# Patient Record
Sex: MALE | Race: WHITE | Employment: OTHER | URBAN - METROPOLITAN AREA
[De-identification: names, ages, dates, MRNs, and addresses within clinical notes are randomized per-mention and may not be internally consistent; named-entity substitution may affect disease eponyms.]

---

## 2021-01-23 ENCOUNTER — APPOINTMENT (OUTPATIENT)
Dept: GENERAL RADIOLOGY | Age: 76
End: 2021-01-23
Attending: EMERGENCY MEDICINE
Payer: MEDICARE

## 2021-01-23 ENCOUNTER — HOSPITAL ENCOUNTER (EMERGENCY)
Age: 76
Discharge: HOME | End: 2021-01-23
Attending: EMERGENCY MEDICINE | Admitting: EMERGENCY MEDICINE
Payer: MEDICARE

## 2021-01-23 VITALS
SYSTOLIC BLOOD PRESSURE: 131 MMHG | WEIGHT: 140 LBS | DIASTOLIC BLOOD PRESSURE: 57 MMHG | HEART RATE: 70 BPM | BODY MASS INDEX: 20.73 KG/M2 | TEMPERATURE: 98.2 F | RESPIRATION RATE: 16 BRPM | HEIGHT: 69 IN | OXYGEN SATURATION: 97 %

## 2021-01-23 DIAGNOSIS — U07.1 COVID-19 VIRUS INFECTION: Primary | ICD-10-CM

## 2021-01-23 PROCEDURE — 74011250636 HC RX REV CODE- 250/636: Performed by: EMERGENCY MEDICINE

## 2021-01-23 PROCEDURE — 71046 X-RAY EXAM CHEST 2 VIEWS: CPT

## 2021-01-23 PROCEDURE — 99282 EMERGENCY DEPT VISIT SF MDM: CPT

## 2021-01-23 PROCEDURE — 96374 THER/PROPH/DIAG INJ IV PUSH: CPT

## 2021-01-23 RX ORDER — DEXAMETHASONE SODIUM PHOSPHATE 100 MG/10ML
10 INJECTION INTRAMUSCULAR; INTRAVENOUS
Status: COMPLETED | OUTPATIENT
Start: 2021-01-23 | End: 2021-01-23

## 2021-01-23 RX ADMIN — DEXAMETHASONE SODIUM PHOSPHATE 10 MG: 10 INJECTION INTRAMUSCULAR; INTRAVENOUS at 14:54

## 2021-01-23 NOTE — ED TRIAGE NOTES
Pt is hearing impaired so his  gives most information. States pt has had a productive cough with sore throat for about two days and tested positive for covid yesterday. O2 sat is 97% in triage. Pt has a mask for triage.

## 2021-01-23 NOTE — ED PROVIDER NOTES
[address] Joan Charles is a 76 y.o. male seen on 1/23/2021 in the [address] [place] [address] in room IWR/IWR. Chief Complaint   Patient presents with    Positive For Covid-19     HPI:      Historian: Patient    REVIEW OF SYSTEMS     Review of Systems   Constitutional: Negative. HENT: Negative. Respiratory: Positive for cough. Cardiovascular: Negative. Gastrointestinal: Negative. Genitourinary: Negative. Musculoskeletal: Negative. Skin: Negative. Neurological: Positive for headaches. Hematological: Negative. Psychiatric/Behavioral: Negative. All other systems reviewed and are negative. PAST MEDICAL HISTORY     No past medical history on file. No past surgical history on file. Social History     Socioeconomic History    Marital status: SINGLE     Spouse name: Not on file    Number of children: Not on file    Years of education: Not on file    Highest education level: Not on file     None     Allergies   Allergen Reactions    Codeine Itching        PHYSICAL EXAM       Vitals:    01/23/21 1347   BP: (!) 131/57   Pulse: 70   Resp: 16   Temp: 98.2 °F (36.8 °C)   SpO2: 97%    Vital signs were reviewed. Physical Exam  Vitals signs and nursing note reviewed. Constitutional:       Appearance: Normal appearance. HENT:      Head: Atraumatic. Nose: Nose normal.      Mouth/Throat:      Mouth: Mucous membranes are moist.   Eyes:      Extraocular Movements: Extraocular movements intact. Neck:      Musculoskeletal: Normal range of motion. Cardiovascular:      Rate and Rhythm: Normal rate and regular rhythm. Pulses: Normal pulses. Heart sounds: Normal heart sounds. Pulmonary:      Effort: Pulmonary effort is normal.      Breath sounds: Rhonchi present. Abdominal:      Palpations: Abdomen is soft. Tenderness: There is no abdominal tenderness. Musculoskeletal: Normal range of motion.    Skin:     General: Skin is warm and dry. Neurological:      General: No focal deficit present. Mental Status: He is alert and oriented to person, place, and time. Psychiatric:         Mood and Affect: Mood normal.         Behavior: Behavior normal.         Thought Content: Thought content normal.         Judgment: Judgment normal.          MEDICAL DECISION MAKING     ED Course:    No results found for this or any previous visit (from the past 8 hour(s)). Xr Chest Pa Lat    Result Date: 1/23/2021  History: cough with positive covid Two views chest Findings: The lungs are well expanded and clear. The cardiac silhouette, and mediastinal contour, and osseous structures are normal.     Impression: Unremarkable two-view chest.       MDM  Number of Diagnoses or Management Options  COVID-19 virus infection  Diagnosis management comments: 51-year-old male with known Covid infection. Patient is not hypoxic. Patient will be discharged home and will  been given a home pulse oximeter. Patient will return to the emergency department her oxygen levels less than 90%. Amount and/or Complexity of Data Reviewed  Tests in the radiology section of CPT®: ordered and reviewed    Patient Progress  Patient progress: stable        Disposition: Discharged  Diagnosis:     ICD-10-CM ICD-9-CM   1. COVID-19 virus infection  U07.1 079.89     ____________________________________________________________________  A portion of this note was generated using voice recognition dictation software. While the note has been reviewed for accuracy, please note certain words and phrases may not be transcribed as intended and some grammatical and/or typographical errors may be present.

## 2021-01-23 NOTE — ED NOTES
I have reviewed discharge instructions with the patient. The patient verbalized understanding. Patient left ED via Discharge Method: wheelchair to Home with . Opportunity for questions and clarification provided. Patient given 0 scripts. To continue your aftercare when you leave the hospital, you may receive an automated call from our care team to check in on how you are doing. This is a free service and part of our promise to provide the best care and service to meet your aftercare needs.  If you have questions, or wish to unsubscribe from this service please call [phone]. Thank you for Choosing our [facility] Emergency Department.

## 2021-02-01 ENCOUNTER — PATIENT OUTREACH (OUTPATIENT)
Dept: CASE MANAGEMENT | Age: 76
End: 2021-02-01

## 2021-02-01 NOTE — PROGRESS NOTES
Yellow alert noted in remote COVID-19 Loop Symptom Monitoring Program. Messaged patient to notify Jeri Mathias if symptoms have worsened since yesterday.

## 2021-03-22 ENCOUNTER — APPOINTMENT (OUTPATIENT)
Dept: CT IMAGING | Age: 76
DRG: 305 | End: 2021-03-22
Attending: EMERGENCY MEDICINE
Payer: MEDICARE

## 2021-03-22 ENCOUNTER — HOSPITAL ENCOUNTER (INPATIENT)
Age: 76
LOS: 1 days | Discharge: HOME | DRG: 305 | End: 2021-03-25
Attending: EMERGENCY MEDICINE | Admitting: INTERNAL MEDICINE
Payer: MEDICARE

## 2021-03-22 DIAGNOSIS — I10 MALIGNANT HYPERTENSION: Primary | ICD-10-CM

## 2021-03-22 DIAGNOSIS — N17.9 AKI (ACUTE KIDNEY INJURY) (HCC): ICD-10-CM

## 2021-03-22 DIAGNOSIS — R27.0 ATAXIA: ICD-10-CM

## 2021-03-22 PROBLEM — E11.9 TYPE 2 DIABETES MELLITUS WITHOUT COMPLICATION, WITHOUT LONG-TERM CURRENT USE OF INSULIN (HCC): Chronic | Status: ACTIVE | Noted: 2020-11-03

## 2021-03-22 PROBLEM — H91.93 BILATERAL HEARING LOSS: Chronic | Status: ACTIVE | Noted: 2018-01-09

## 2021-03-22 PROBLEM — I16.0 HYPERTENSIVE URGENCY: Status: ACTIVE | Noted: 2021-03-22

## 2021-03-22 PROBLEM — E11.9 TYPE 2 DIABETES MELLITUS WITHOUT COMPLICATION, WITHOUT LONG-TERM CURRENT USE OF INSULIN (HCC): Status: ACTIVE | Noted: 2020-11-03

## 2021-03-22 PROBLEM — H91.93 BILATERAL HEARING LOSS: Status: ACTIVE | Noted: 2018-01-09

## 2021-03-22 PROBLEM — N18.30 ACUTE RENAL FAILURE SUPERIMPOSED ON STAGE 3 CHRONIC KIDNEY DISEASE (HCC): Status: ACTIVE | Noted: 2021-03-22

## 2021-03-22 PROBLEM — N18.9 ACUTE ON CHRONIC RENAL FAILURE (HCC): Status: ACTIVE | Noted: 2021-03-22

## 2021-03-22 LAB
ALBUMIN SERPL-MCNC: 4.3 G/DL (ref 3.2–4.6)
ALBUMIN/GLOB SERPL: 1.3 {RATIO} (ref 1.2–3.5)
ALP SERPL-CCNC: 57 U/L (ref 50–136)
ALT SERPL-CCNC: 38 U/L (ref 12–65)
ANION GAP SERPL CALC-SCNC: 5 MMOL/L (ref 7–16)
AST SERPL-CCNC: 24 U/L (ref 15–37)
ATRIAL RATE: 70 BPM
BASOPHILS # BLD: 0.1 K/UL (ref 0–0.2)
BASOPHILS NFR BLD: 2 % (ref 0–2)
BILIRUB SERPL-MCNC: 0.4 MG/DL (ref 0.2–1.1)
BUN SERPL-MCNC: 36 MG/DL (ref 8–23)
CALCIUM SERPL-MCNC: 10 MG/DL (ref 8.3–10.4)
CALCULATED P AXIS, ECG09: 60 DEGREES
CALCULATED R AXIS, ECG10: 21 DEGREES
CALCULATED T AXIS, ECG11: 56 DEGREES
CHLORIDE SERPL-SCNC: 105 MMOL/L (ref 98–107)
CO2 SERPL-SCNC: 28 MMOL/L (ref 21–32)
CREAT SERPL-MCNC: 1.78 MG/DL (ref 0.8–1.5)
DIAGNOSIS, 93000: NORMAL
DIFFERENTIAL METHOD BLD: ABNORMAL
EOSINOPHIL # BLD: 0.1 K/UL (ref 0–0.8)
EOSINOPHIL NFR BLD: 2 % (ref 0.5–7.8)
ERYTHROCYTE [DISTWIDTH] IN BLOOD BY AUTOMATED COUNT: 12.9 % (ref 11.9–14.6)
FOLATE SERPL-MCNC: 22.7 NG/ML (ref 3.1–17.5)
GLOBULIN SER CALC-MCNC: 3.4 G/DL (ref 2.3–3.5)
GLUCOSE BLD STRIP.AUTO-MCNC: 89 MG/DL (ref 65–100)
GLUCOSE SERPL-MCNC: 116 MG/DL (ref 65–100)
HCT VFR BLD AUTO: 43.2 % (ref 41.1–50.3)
HGB BLD-MCNC: 14 G/DL (ref 13.6–17.2)
IMM GRANULOCYTES # BLD AUTO: 0 K/UL (ref 0–0.5)
IMM GRANULOCYTES NFR BLD AUTO: 0 % (ref 0–5)
LYMPHOCYTES # BLD: 2.3 K/UL (ref 0.5–4.6)
LYMPHOCYTES NFR BLD: 38 % (ref 13–44)
MAGNESIUM SERPL-MCNC: 1.8 MG/DL (ref 1.8–2.4)
MCH RBC QN AUTO: 29.2 PG (ref 26.1–32.9)
MCHC RBC AUTO-ENTMCNC: 32.4 G/DL (ref 31.4–35)
MCV RBC AUTO: 90 FL (ref 79.6–97.8)
MONOCYTES # BLD: 0.8 K/UL (ref 0.1–1.3)
MONOCYTES NFR BLD: 13 % (ref 4–12)
NEUTS SEG # BLD: 2.8 K/UL (ref 1.7–8.2)
NEUTS SEG NFR BLD: 46 % (ref 43–78)
NRBC # BLD: 0 K/UL (ref 0–0.2)
P-R INTERVAL, ECG05: 156 MS
PLATELET # BLD AUTO: 257 K/UL (ref 150–450)
PMV BLD AUTO: 11.2 FL (ref 9.4–12.3)
POTASSIUM SERPL-SCNC: 5.1 MMOL/L (ref 3.5–5.1)
PROT SERPL-MCNC: 7.7 G/DL (ref 6.3–8.2)
Q-T INTERVAL, ECG07: 362 MS
QRS DURATION, ECG06: 82 MS
QTC CALCULATION (BEZET), ECG08: 390 MS
RBC # BLD AUTO: 4.8 M/UL (ref 4.23–5.6)
SERVICE CMNT-IMP: NORMAL
SODIUM SERPL-SCNC: 138 MMOL/L (ref 136–145)
VENTRICULAR RATE, ECG03: 70 BPM
VIT B12 SERPL-MCNC: 452 PG/ML (ref 193–986)
WBC # BLD AUTO: 6.1 K/UL (ref 4.3–11.1)

## 2021-03-22 PROCEDURE — 82962 GLUCOSE BLOOD TEST: CPT

## 2021-03-22 PROCEDURE — 96374 THER/PROPH/DIAG INJ IV PUSH: CPT

## 2021-03-22 PROCEDURE — 85025 COMPLETE CBC W/AUTO DIFF WBC: CPT

## 2021-03-22 PROCEDURE — 74011250636 HC RX REV CODE- 250/636: Performed by: EMERGENCY MEDICINE

## 2021-03-22 PROCEDURE — 83735 ASSAY OF MAGNESIUM: CPT

## 2021-03-22 PROCEDURE — 74011000636 HC RX REV CODE- 636: Performed by: EMERGENCY MEDICINE

## 2021-03-22 PROCEDURE — 2709999900 HC NON-CHARGEABLE SUPPLY

## 2021-03-22 PROCEDURE — 80053 COMPREHEN METABOLIC PANEL: CPT

## 2021-03-22 PROCEDURE — 74011250636 HC RX REV CODE- 250/636: Performed by: FAMILY MEDICINE

## 2021-03-22 PROCEDURE — 82746 ASSAY OF FOLIC ACID SERUM: CPT

## 2021-03-22 PROCEDURE — 70450 CT HEAD/BRAIN W/O DYE: CPT

## 2021-03-22 PROCEDURE — 99285 EMERGENCY DEPT VISIT HI MDM: CPT

## 2021-03-22 PROCEDURE — 99218 HC RM OBSERVATION: CPT

## 2021-03-22 PROCEDURE — 74011000250 HC RX REV CODE- 250: Performed by: EMERGENCY MEDICINE

## 2021-03-22 PROCEDURE — 74011000258 HC RX REV CODE- 258: Performed by: EMERGENCY MEDICINE

## 2021-03-22 PROCEDURE — 96361 HYDRATE IV INFUSION ADD-ON: CPT

## 2021-03-22 PROCEDURE — 70498 CT ANGIOGRAPHY NECK: CPT

## 2021-03-22 PROCEDURE — 93005 ELECTROCARDIOGRAM TRACING: CPT | Performed by: EMERGENCY MEDICINE

## 2021-03-22 PROCEDURE — 74011250637 HC RX REV CODE- 250/637: Performed by: FAMILY MEDICINE

## 2021-03-22 PROCEDURE — 82607 VITAMIN B-12: CPT

## 2021-03-22 PROCEDURE — 0042T CT PERF W CBF: CPT

## 2021-03-22 PROCEDURE — 4A03X5D MEASUREMENT OF ARTERIAL FLOW, INTRACRANIAL, EXTERNAL APPROACH: ICD-10-PCS | Performed by: STUDENT IN AN ORGANIZED HEALTH CARE EDUCATION/TRAINING PROGRAM

## 2021-03-22 RX ORDER — FENOFIBRATE 50 MG/1
50 CAPSULE ORAL DAILY
COMMUNITY

## 2021-03-22 RX ORDER — DEXTROSE 50 % IN WATER (D50W) INTRAVENOUS SYRINGE
25-50 AS NEEDED
Status: DISCONTINUED | OUTPATIENT
Start: 2021-03-22 | End: 2021-03-25 | Stop reason: HOSPADM

## 2021-03-22 RX ORDER — HYDRALAZINE HYDROCHLORIDE 25 MG/1
50 TABLET, FILM COATED ORAL
Status: DISCONTINUED | OUTPATIENT
Start: 2021-03-22 | End: 2021-03-25 | Stop reason: HOSPADM

## 2021-03-22 RX ORDER — HYDROCHLOROTHIAZIDE 25 MG/1
25 TABLET ORAL DAILY
Status: DISCONTINUED | OUTPATIENT
Start: 2021-03-23 | End: 2021-03-25 | Stop reason: HOSPADM

## 2021-03-22 RX ORDER — ASPIRIN 325 MG
325 TABLET ORAL DAILY
Status: DISCONTINUED | OUTPATIENT
Start: 2021-03-23 | End: 2021-03-25 | Stop reason: HOSPADM

## 2021-03-22 RX ORDER — DEXTROSE 40 %
15 GEL (GRAM) ORAL AS NEEDED
Status: DISCONTINUED | OUTPATIENT
Start: 2021-03-22 | End: 2021-03-25 | Stop reason: HOSPADM

## 2021-03-22 RX ORDER — LANOLIN ALCOHOL/MO/W.PET/CERES
400 CREAM (GRAM) TOPICAL 2 TIMES DAILY
Status: DISCONTINUED | OUTPATIENT
Start: 2021-03-22 | End: 2021-03-24

## 2021-03-22 RX ORDER — SODIUM CHLORIDE 0.9 % (FLUSH) 0.9 %
10 SYRINGE (ML) INJECTION
Status: COMPLETED | OUTPATIENT
Start: 2021-03-22 | End: 2021-03-22

## 2021-03-22 RX ORDER — FAMOTIDINE 20 MG/1
20 TABLET, FILM COATED ORAL
Status: DISCONTINUED | OUTPATIENT
Start: 2021-03-22 | End: 2021-03-25 | Stop reason: HOSPADM

## 2021-03-22 RX ORDER — ZINC GLUCONATE 10 MG
400 LOZENGE ORAL 3 TIMES DAILY
COMMUNITY

## 2021-03-22 RX ORDER — SODIUM CHLORIDE 0.9 % (FLUSH) 0.9 %
5-40 SYRINGE (ML) INJECTION EVERY 8 HOURS
Status: DISCONTINUED | OUTPATIENT
Start: 2021-03-22 | End: 2021-03-25 | Stop reason: HOSPADM

## 2021-03-22 RX ORDER — LOSARTAN POTASSIUM 50 MG/1
25 TABLET ORAL DAILY
Status: DISCONTINUED | OUTPATIENT
Start: 2021-03-23 | End: 2021-03-25 | Stop reason: HOSPADM

## 2021-03-22 RX ORDER — LOSARTAN POTASSIUM 25 MG/1
25 TABLET ORAL DAILY
COMMUNITY

## 2021-03-22 RX ORDER — SODIUM CHLORIDE 9 MG/ML
100 INJECTION, SOLUTION INTRAVENOUS CONTINUOUS
Status: DISPENSED | OUTPATIENT
Start: 2021-03-22 | End: 2021-03-23

## 2021-03-22 RX ORDER — METOCLOPRAMIDE HYDROCHLORIDE 5 MG/ML
10 INJECTION INTRAMUSCULAR; INTRAVENOUS
Status: DISCONTINUED | OUTPATIENT
Start: 2021-03-22 | End: 2021-03-22

## 2021-03-22 RX ORDER — LABETALOL HYDROCHLORIDE 5 MG/ML
20 INJECTION, SOLUTION INTRAVENOUS ONCE
Status: COMPLETED | OUTPATIENT
Start: 2021-03-22 | End: 2021-03-22

## 2021-03-22 RX ORDER — ONDANSETRON 2 MG/ML
4 INJECTION INTRAMUSCULAR; INTRAVENOUS
Status: DISCONTINUED | OUTPATIENT
Start: 2021-03-22 | End: 2021-03-25 | Stop reason: HOSPADM

## 2021-03-22 RX ORDER — SODIUM CHLORIDE 0.9 % (FLUSH) 0.9 %
5-40 SYRINGE (ML) INJECTION AS NEEDED
Status: DISCONTINUED | OUTPATIENT
Start: 2021-03-22 | End: 2021-03-25 | Stop reason: HOSPADM

## 2021-03-22 RX ORDER — BISACODYL 5 MG
5 TABLET, DELAYED RELEASE (ENTERIC COATED) ORAL DAILY PRN
Status: DISCONTINUED | OUTPATIENT
Start: 2021-03-22 | End: 2021-03-25 | Stop reason: HOSPADM

## 2021-03-22 RX ORDER — ACETAMINOPHEN 325 MG/1
650 TABLET ORAL
Status: DISCONTINUED | OUTPATIENT
Start: 2021-03-22 | End: 2021-03-25 | Stop reason: HOSPADM

## 2021-03-22 RX ORDER — ENOXAPARIN SODIUM 100 MG/ML
40 INJECTION SUBCUTANEOUS EVERY 24 HOURS
Status: DISCONTINUED | OUTPATIENT
Start: 2021-03-22 | End: 2021-03-25 | Stop reason: HOSPADM

## 2021-03-22 RX ORDER — FENOFIBRATE 160 MG/1
160 TABLET ORAL DAILY
Status: DISCONTINUED | OUTPATIENT
Start: 2021-03-23 | End: 2021-03-25 | Stop reason: HOSPADM

## 2021-03-22 RX ORDER — METOCLOPRAMIDE HYDROCHLORIDE 5 MG/ML
5 INJECTION INTRAMUSCULAR; INTRAVENOUS
Status: DISPENSED | OUTPATIENT
Start: 2021-03-22 | End: 2021-03-23

## 2021-03-22 RX ADMIN — MAGNESIUM GLUCONATE 500 MG ORAL TABLET 400 MG: 500 TABLET ORAL at 21:13

## 2021-03-22 RX ADMIN — IOPAMIDOL 100 ML: 755 INJECTION, SOLUTION INTRAVENOUS at 17:19

## 2021-03-22 RX ADMIN — Medication 10 ML: at 21:13

## 2021-03-22 RX ADMIN — FAMOTIDINE 20 MG: 20 TABLET, FILM COATED ORAL at 21:13

## 2021-03-22 RX ADMIN — LABETALOL HYDROCHLORIDE 20 MG: 5 INJECTION INTRAVENOUS at 18:36

## 2021-03-22 RX ADMIN — SODIUM CHLORIDE 100 ML/HR: 900 INJECTION, SOLUTION INTRAVENOUS at 21:12

## 2021-03-22 RX ADMIN — SODIUM CHLORIDE 1000 ML: 900 INJECTION, SOLUTION INTRAVENOUS at 18:34

## 2021-03-22 RX ADMIN — SODIUM CHLORIDE 100 ML: 900 INJECTION, SOLUTION INTRAVENOUS at 17:19

## 2021-03-22 RX ADMIN — Medication 10 ML: at 17:19

## 2021-03-22 NOTE — ED PROVIDER NOTES
Patient is a pleasant 49-year-old male presenting to the emergency department complaining of feeling off balance and falling to the right which started yesterday and has persisted throughout the day today. He was accompanied to the ER by his partner who says that he has been asking him stroke questions over the last 24 hours and has noticed that he has had rambling conversations and a little bit of slurred speech. This happened last week and he went to [facility] on March 17 and had a CT of his head done at that time. The patient is hard of hearing even with his hearing aids and has to read lips but answers questions appropriately. He says he feels well now but feels different than normal.           Past Medical History:   Diagnosis Date    CAD (coronary artery disease)     leaky valves    Cancer ([facility]     prostate 2018    Hypertension     Ill-defined condition     potential hemorhoids        History reviewed. No pertinent surgical history. History reviewed. No pertinent family history.     Social History     Socioeconomic History    Marital status: SINGLE     Spouse name: Not on file    Number of children: Not on file    Years of education: Not on file    Highest education level: Not on file   Occupational History    Not on file   Social Needs    Financial resource strain: Not on file    Food insecurity     Worry: Not on file     Inability: Not on file    Transportation needs     Medical: Not on file     Non-medical: Not on file   Tobacco Use    Smoking status: Former Smoker    Smokeless tobacco: Never Used   Substance and Sexual Activity    Alcohol use: Not Currently    Drug use: Not Currently    Sexual activity: Not on file   Lifestyle    Physical activity     Days per week: Not on file     Minutes per session: Not on file    Stress: Not on file   Relationships    Social connections     Talks on phone: Not on file     Gets together: Not on file     Attends Quaker service: Not on file     Active member of club or organization: Not on file     Attends meetings of clubs or organizations: Not on file     Relationship status: Not on file    Intimate partner violence     Fear of current or ex partner: Not on file     Emotionally abused: Not on file     Physically abused: Not on file     Forced sexual activity: Not on file   Other Topics Concern    Not on file   Social History Narrative    Not on file         ALLERGIES: Codeine    Review of Systems   Musculoskeletal: Positive for gait problem. Neurological: Positive for numbness. All other systems reviewed and are negative. Vitals:    03/22/21 1535   BP: (!) 192/74   Pulse: 72   Resp: 16   Temp: 97.5 °F (36.4 °C)   SpO2: 97%   Weight: 67.1 kg (148 lb)   Height: 5' 10\" (1.778 m)            Physical Exam     GENERAL:The patient has Body mass index is 21.24 kg/m². Well-hydrated. Hard of hearing   VITAL SIGNS: Heart rate, blood pressure, respiratory rate reviewed as recorded in  nurse's notes  EYES: Pupils reactive. Extraocular motion intact. No conjunctival redness or drainage. EARS: No external masses or lesions. MOUTH/THROAT: Pharynx clear; airway patent. NECK: Supple, no meningeal signs. Trachea midline. No masses or thyromegaly. LUNGS: Breath sounds clear and equal bilaterally no accessory muscle use  CHEST: No deformity  CARDIOVASCULAR: Regular rate and rhythm  ABDOMEN: Soft without tenderness. No palpable masses or organomegaly. No  peritoneal signs. No rigidity. EXTREMITIES: No clubbing or cyanosis. No joint swelling. Normal muscle tone. No  restricted range of motion appreciated. NEUROLOGIC: Sensation is grossly intact. Cranial nerve exam reveals face is  symmetrical, tongue. Pronator drift positive in both legs equally. Negative pronator drift in the arms. Patient is able to finger-to-nose without difficulty and is able to do heel-to-shin without any trouble.   His  strength 5 out of 5 equal bilaterally and his sensation V1 through 3 is normal bilaterally. SKIN: No rash or petechiae. Good skin turgor palpated. PSYCHIATRIC: Alert and oriented. Appropriate behavior and judgment. MDM  Number of Diagnoses or Management Options  Diagnosis management comments: TIA, CVA, intracranial hemorrhage, ischemic stroke, brain tumor, Bell's palsy,    tension headache, migraine headache,    peripheral neuropathy, metabolic disorder, Guillian Phoenix syndrome, multiple sclerosis,    electrolyte abnormality    seizure, pseudoseizures, status epilepticus, malingering           Amount and/or Complexity of Data Reviewed  Clinical lab tests: reviewed and ordered  Tests in the radiology section of CPT®: reviewed and ordered  Tests in the medicine section of CPT®: ordered and reviewed  Review and summarize past medical records: yes  Independent visualization of images, tracings, or specimens: yes      ED Course as of Mar 22 1831   Mon Mar 22, 2021   1804 ED Interpretation    1. Scattered atherosclerotic changes not unusual for age. 2. Small but patent left vertebral artery. 3. No evidence of large vessel acute pathology or occlusion. CTA CODE NEURO HEAD AND NECK W CONT [KH]   1804 CT Perfusion w/ Cerebral Blood Flow [KH]   1804 ED Interpretation    No core infarct or ischemic penumbra. CT Perfusion w/ Cerebral Blood Flow [KH]   1806 Creatinine(!): 1.78 [KH]   1806 BUN(!): 36 [KH]   1829 I spoke to the patient and his partner about the findings in the emergency department. His kidney function is getting worse since he was seen at [facility] last week. He continues take his losartan at home and eat and drink normally. Because of his repeat ataxia with hypertension and acute kidney injury I talked to him about admission to the hospital and they are agreeable with this plan. The patient was given labetalol and fluids here in the emergency department.     [KH]      ED Course User Index  [KH] Garrett Kaufman,        Critical Care  Performed by: Demetri Florez DO  Authorized by: Demetri Florez DO     Comments:      Critical care time: 49 minutes of critical care time was performed in the emergency department. This was separate from any other procedures listed during the patients emergency department course. The failure to initiate these interventions on an urgent basis would likely have resulted in sudden, clinically significant or life-threatening deterioration in the patients condition.

## 2021-03-22 NOTE — ED TRIAGE NOTES
Patient arrives with complaint of palpitations, headache, balance problems, and tingling in his right index finger and thumb. The headache started this morning that was present when he woke up. He reports headache that has been off and on since January . The tingling in his fingers started around 0900 this morning, roughly two hours after he woke up. The balance issues started today as well. Patient reports that yesterday he complained to the male at bedside, that he wasn't feeling well and had difficulty talking.      This happened last week as well and was seen at [facility].

## 2021-03-22 NOTE — ROUTINE PROCESS
TRANSFER - OUT REPORT: 
 
Verbal report given to Teresita Flynn on Britton Heap  being transferred to Med / Surg rm # [phone] for routine progression of care Report consisted of patients Situation, Background, Assessment and  
Recommendations(SBAR). Information from the following report(s) SBAR was reviewed with the receiving nurse. Lines:  
Peripheral IV 03/22/21 (Active) Opportunity for questions and clarification was provided. Patient transported with: 
 Registered Nurse

## 2021-03-22 NOTE — H&P
[facility]  History and Physical    Patient ID:  Ofelia Dooley  male  [id number]  [id number]    Admission Date: 3/22/2021  Chief Complaint: Feeling off balance  Reason for Admission: Hypertensive urgency    ASSESSMENT & PLAN:    Active Hospital Problems    Diagnosis Date Noted    Acute renal failure superimposed on stage [facility] 03/22/2021    Hypertensive urgency 03/22/2021    Type 2 diabetes mellitus without complication, without long-term current use of insulin ([facility] [address] 11/03/2020     Principal Problem:    Hypertensive urgency (3/22/2021)  Continue home medications, add HCTZ daily and add hydralazine as needed    Ataxia: We will get a CVA work-up with MRI, echocardiogram, full labs, PT/OT evaluations. He is well beyond any timeline for TPA or intervention and his CTs in the ER were negative    Active Problems:    Acute renal failure superimposed on stage 3 chronic kidney disease ([facility] [address] (3/22/2021)  IV fluids and monitor labs; will continue his losartan, but if his renal failure worsens, will consider discontinuing or asking nephrology for their input      Type 2 diabetes mellitus without complication, without long-term current use of insulin ([facility] (11/3/2020)  Patient reports he has never been started on diabetic medications but  was told that he was borderline diabetic. Will place on sliding scale insulin protocol      Disposition: admit to observation, remote telemetry  Diet: Diabetic  VTE ppx: Lovenox   GI ppx: Pepcid   CODE STATUS: DNR and he has a living will and power of  papers  Surrogate decision-maker: Partner    HISTORY OF PRESENT ILLNESS:  Patient was discussed with the ER provider prior to seeing the patient. Patient is a 76 y.o. male with medical h/o hypertension and borderline diabetes, who presented to [facility] ED with \"feeling off balance\". He reports that he is also falling to the right side.   The symptoms started yesterday and have persisted throughout today. His partner also notes that he has been having rambling conversations and some slurred speech. He also had the symptoms happen last week, and he went to [facility] for evaluation. He had a head CT done that was negative. At that time, his systolic blood pressure was over 200. He denies fever/chills, NVD, shortness of breath, chest pain, abdominal pain, focal weaknesses, focal numbness or tingling, difficulty with speech or swallow. REVIEW OF SYSTEMS:  A 14 point review of systems was taken and pertinent positive and negative as per HPI. Allergies   Allergen Reactions    Codeine Itching       Prior to Admission Medications   Prescriptions Last Dose Informant Patient Reported? Taking? Fenofibrate 50 mg cap   Yes Yes   Sig: Take 50 mg by mouth daily. losartan (COZAAR) 25 mg tablet   Yes Yes   Sig: Take 25 mg by mouth daily. magnesium 250 mg tab   Yes Yes   Sig: Take 400 mg by mouth three (3) times daily. Facility-Administered Medications: None       Past Medical History:   Diagnosis Date    CAD (coronary artery disease)     leaky valves    Cancer ([facility] [address]     prostate 2018    Hypertension     Ill-defined condition     potential hemorhoids      History reviewed. No pertinent surgical history. Social History     Tobacco Use    Smoking status: Former Smoker    Smokeless tobacco: Never Used   Substance Use Topics    Alcohol use: Not Currently       FAMILY HISTORY:  Reviewed and non-contributory to admitting problem, unless otherwise stated in the HPI    History reviewed. No pertinent family history.           PHYSICAL EXAM:    Patient Vitals for the past 24 hrs:   Temp Pulse Resp BP SpO2   03/22/21 1840  60 18 (!) 146/63 97 %   03/22/21 1753  74 18 (!) 188/76 98 %   03/22/21 1535 97.5 °F (36.4 °C) 72 16 (!) 192/74 97 %     Visit Vitals  BP (!) 146/63 (BP 1 Location: Left arm, BP Patient Position: At rest)   Pulse 60   Temp 97.5 °F (36.4 °C)   Resp 18   Ht 5' 10\" (1.778 m)   Wt 67.1 kg (148 lb)   SpO2 97%   BMI 21.24 kg/m²       General:    WD and WN, No apparent distress. Eyes:  PERRL; EOMI; sclera normal/non-icteric  HENT:  Normocephalic, without obvious abnormality; Oropharynx is clear with tacky mucous membranes   Very hard of hearing, but can read lips  Resp:    Clear to auscultation bilaterally. Resp are even and unlabored  CVS/Heart: Regular rate and rhythm,  No LE edema    GI:    Soft, non-tender. Not distended. Bowel sounds normal.     Musc/SK: Muscle strength is appropriate for age/condition; No cyanosis.  No clubbing  Skin:  No obvious rash/lesions  Neurologic: CN II - XII are grossly intact - Eye exam as noted above; non focal  Psych: Alert and oriented x 4;  Judgement and insight are normal          Intake/Output last 3 shifts:  Date 03/21/21 1900 - 03/22/21 0659(Not Admitted) 03/22/21 0700 - 03/23/21 0659   Shift 6281-9795 24 Hour Total 0697-3775 6471-5292 24 Hour Total   INTAKE   I.V.   100(0.1)  100     Volume (sodium chloride 0.9 % bolus infusion 100 mL)   100  100   Shift Total(mL/kg)   100(1.5)  100(1.5)   OUTPUT   Shift Total(mL/kg)        NET   100  100   Weight (kg)   67.1 67.1 67.1       Labs:  CMP:   Lab Results   Component Value Date/Time     03/22/2021 04:36 PM    K 5.1 03/22/2021 04:36 PM     03/22/2021 04:36 PM    CO2 28 03/22/2021 04:36 PM    AGAP 5 (L) 03/22/2021 04:36 PM     (H) 03/22/2021 04:36 PM    BUN 36 (H) 03/22/2021 04:36 PM    CREA 1.78 (H) 03/22/2021 04:36 PM    GFRAA 48 (L) 03/22/2021 04:36 PM    GFRNA 40 (L) 03/22/2021 04:36 PM    CA 10.0 03/22/2021 04:36 PM    MG 1.8 03/22/2021 04:36 PM    ALB 4.3 03/22/2021 04:36 PM    TBILI 0.4 03/22/2021 04:36 PM    TP 7.7 03/22/2021 04:36 PM    GLOB 3.4 03/22/2021 04:36 PM    AGRAT 1.3 03/22/2021 04:36 PM    ALT 38 03/22/2021 04:36 PM         CBC:    Lab Results   Component Value Date/Time    WBC 6.1 03/22/2021 04:36 PM    HGB 14.0 03/22/2021 04:36 PM    HCT 43.2 03/22/2021 04:36 PM  03/22/2021 04:36 PM       No results found for: INR, PTMR, PTP, PT1, PT2, INREXT    ABG:  No results found for: PH, PHI, PCO2, PCO2I, PO2, PO2I, HCO3, HCO3I, FIO2, FIO2I        No results found for: CPK, RCK1, RCK2, RCK3, RCK4, CKMB, CKNDX, CKND1, TROPT, TROIQ, BNPP, BNP    Imaging & Other Studies:  Ct Perf W Cbf    Result Date: 3/22/2021  EXAMINATION: CT PERFUSION DATE: 3/22/2021 5:26 PM ACCESSION NUMBER:  [id number] INDICATION: : acute neuro changes, possible LVAO COMPARISON: CT head and CTA Head and neck same-day TECHNIQUE: CT perfusion of the brain was obtained after the administration of intravenous contrast. Perfusion maps and perfusion analysis output were generated using the VIZ perfusion processing software algorithm. Radiation dose reduction techniques were used for this study:  Our CT scanners use one or all of the following: Automated exposure control, adjustment of the mA and/or kVp according to patient's size, iterative reconstruction. FINDINGS: Appropriate input and output selection. Mild patient motion. Appropriate time function curves. VIZ Output Values: CBF < 30% volume (best correlation with core infarct volume without overcalls): 0 ml (core infarction volume greater than 50 cc associated with poor outcomes) Tmax > 6 seconds: 0 ml Tmax/CBF Mismatch Volume: 0 ml Tmax/CBF Mismatch Ratio: N/A Hypoperfusion Intensity Ratio (Tmax > 10 seconds / Tmax > 6 seconds): 0 (values greater than 0.5 associated with poor outcome) Tmax > 10 seconds Volume: 0 ml (volume greater than 100 mL is associated with poor outcome)     No quantitative mismatch perfusion     Ct Code Neuro Head Wo Contrast    Result Date: 3/22/2021  Noncontrast head CT Clinical Indication: Acute code stroke evaluation. Right upper extremity paresthesias, gait imbalance, generalized headache and cardiac palpitations onset upon awakening this morning. Technique: Noncontrast axial images were obtained through the brain.  All CT scans at this location are performed using dose modulation techniques as appropriate including the following: Automated exposure control, adjustment of the MA and/or kV according to patient's size, or use of iterative reconstruction technique. Reformatted coronal images obtained to further evaluate bones, soft tissues, extra-axial spaces. Comparison: None available Findings: There is no acute intracranial hemorrhage, hydrocephalus, intra-axial mass, or mass-effect. There is no CT evidence of acute large artery territorial infarction or abnormal extra-axial fluid collection. The mastoid air cells and paranasal sinuses are clear where imaged. No displaced skull fractures are present. No CT evidence of acute intracranial abnormality.       EKG Results     Procedure 720 Value Units Date/Time    EKG, 12 LEAD, INITIAL [[id number]] Collected: 03/22/21 1613    Order Status: Completed Updated: 03/22/21 1624     Ventricular Rate 70 BPM      Atrial Rate 70 BPM      P-R Interval 156 ms      QRS Duration 82 ms      Q-T Interval 362 ms      QTC Calculation (Bezet) 390 ms      Calculated P Axis 60 degrees      Calculated R Axis 21 degrees      Calculated T Axis 56 degrees      Diagnosis --     Sinus rhythm with frequent Premature ventricular complexes  Otherwise normal ECG  No previous ECGs available            Active Problems:  Patient Active Problem List    Diagnosis Date Noted    Acute renal failure superimposed on stage 3 chronic kidney disease ([address] 03/22/2021    Hypertensive urgency 03/22/2021    Type 2 diabetes mellitus without complication, without long-term current use of insulin ([address] 11/03/2020    Bilateral hearing loss 01/09/2018    Essential hypertension 01/27/2014         Maude Amin MD  [facility] Hospitalist Service  3/22/2021 7:16 PM

## 2021-03-22 NOTE — PROGRESS NOTES
TRANSFER - IN REPORT:    Verbal report received from Saad Urban RN on Isi Ge  being received from ED for routine progression of care      Report consisted of patients Situation, Background, Assessment and   Recommendations(SBAR). Information from the following report(s) SBAR, Kardex, ED Summary, Intake/Output, MAR and Recent Results was reviewed with the receiving nurse. Opportunity for questions and clarification was provided. Assessment completed upon patients arrival to unit and care assumed.

## 2021-03-23 ENCOUNTER — APPOINTMENT (OUTPATIENT)
Dept: MRI IMAGING | Age: 76
DRG: 305 | End: 2021-03-23
Attending: FAMILY MEDICINE
Payer: MEDICARE

## 2021-03-23 PROBLEM — R27.0 ATAXIA: Status: ACTIVE | Noted: 2021-03-23

## 2021-03-23 LAB
ANION GAP SERPL CALC-SCNC: 4 MMOL/L (ref 7–16)
BUN SERPL-MCNC: 31 MG/DL (ref 8–23)
CALCIUM SERPL-MCNC: 9.8 MG/DL (ref 8.3–10.4)
CHLORIDE SERPL-SCNC: 106 MMOL/L (ref 98–107)
CHOLEST SERPL-MCNC: 237 MG/DL
CO2 SERPL-SCNC: 28 MMOL/L (ref 21–32)
CREAT SERPL-MCNC: 1.54 MG/DL (ref 0.8–1.5)
ERYTHROCYTE [DISTWIDTH] IN BLOOD BY AUTOMATED COUNT: 13.2 % (ref 11.9–14.6)
EST. AVERAGE GLUCOSE BLD GHB EST-MCNC: 131 MG/DL
GLUCOSE BLD STRIP.AUTO-MCNC: 102 MG/DL (ref 65–100)
GLUCOSE BLD STRIP.AUTO-MCNC: 109 MG/DL (ref 65–100)
GLUCOSE BLD STRIP.AUTO-MCNC: 88 MG/DL (ref 65–100)
GLUCOSE BLD STRIP.AUTO-MCNC: 90 MG/DL (ref 65–100)
GLUCOSE SERPL-MCNC: 101 MG/DL (ref 65–100)
HBA1C MFR BLD: 6.2 % (ref 4.2–6.3)
HCT VFR BLD AUTO: 41.1 % (ref 41.1–50.3)
HDLC SERPL-MCNC: 30 MG/DL (ref 40–60)
HDLC SERPL: 7.9 {RATIO}
HGB BLD-MCNC: 13.3 G/DL (ref 13.6–17.2)
LDLC SERPL CALC-MCNC: 153.8 MG/DL
LIPID PROFILE,FLP: ABNORMAL
MAGNESIUM SERPL-MCNC: 1.7 MG/DL (ref 1.8–2.4)
MCH RBC QN AUTO: 28.8 PG (ref 26.1–32.9)
MCHC RBC AUTO-ENTMCNC: 32.4 G/DL (ref 31.4–35)
MCV RBC AUTO: 89 FL (ref 79.6–97.8)
NRBC # BLD: 0 K/UL (ref 0–0.2)
PLATELET # BLD AUTO: 261 K/UL (ref 150–450)
PMV BLD AUTO: 11.3 FL (ref 9.4–12.3)
POTASSIUM SERPL-SCNC: 4.7 MMOL/L (ref 3.5–5.1)
RBC # BLD AUTO: 4.62 M/UL (ref 4.23–5.6)
SERVICE CMNT-IMP: ABNORMAL
SERVICE CMNT-IMP: ABNORMAL
SERVICE CMNT-IMP: NORMAL
SERVICE CMNT-IMP: NORMAL
SODIUM SERPL-SCNC: 138 MMOL/L (ref 136–145)
T4 FREE SERPL-MCNC: 1.1 NG/DL (ref 0.9–1.8)
TRIGL SERPL-MCNC: 266 MG/DL (ref 35–150)
TSH SERPL DL<=0.005 MIU/L-ACNC: 6.77 UIU/ML
VLDLC SERPL CALC-MCNC: 53.2 MG/DL (ref 6–23)
WBC # BLD AUTO: 7.3 K/UL (ref 4.3–11.1)

## 2021-03-23 PROCEDURE — 70551 MRI BRAIN STEM W/O DYE: CPT

## 2021-03-23 PROCEDURE — 74011250636 HC RX REV CODE- 250/636: Performed by: INTERNAL MEDICINE

## 2021-03-23 PROCEDURE — 97165 OT EVAL LOW COMPLEX 30 MIN: CPT

## 2021-03-23 PROCEDURE — 84439 ASSAY OF FREE THYROXINE: CPT

## 2021-03-23 PROCEDURE — 97161 PT EVAL LOW COMPLEX 20 MIN: CPT

## 2021-03-23 PROCEDURE — 74011000250 HC RX REV CODE- 250: Performed by: INTERNAL MEDICINE

## 2021-03-23 PROCEDURE — 84443 ASSAY THYROID STIM HORMONE: CPT

## 2021-03-23 PROCEDURE — 96375 TX/PRO/DX INJ NEW DRUG ADDON: CPT

## 2021-03-23 PROCEDURE — 84481 FREE ASSAY (FT-3): CPT

## 2021-03-23 PROCEDURE — 83036 HEMOGLOBIN GLYCOSYLATED A1C: CPT

## 2021-03-23 PROCEDURE — 97530 THERAPEUTIC ACTIVITIES: CPT

## 2021-03-23 PROCEDURE — 74011250637 HC RX REV CODE- 250/637: Performed by: FAMILY MEDICINE

## 2021-03-23 PROCEDURE — 97535 SELF CARE MNGMENT TRAINING: CPT

## 2021-03-23 PROCEDURE — 99218 HC RM OBSERVATION: CPT

## 2021-03-23 PROCEDURE — 80061 LIPID PANEL: CPT

## 2021-03-23 PROCEDURE — 85027 COMPLETE CBC AUTOMATED: CPT

## 2021-03-23 PROCEDURE — 74011000250 HC RX REV CODE- 250: Performed by: FAMILY MEDICINE

## 2021-03-23 PROCEDURE — C8929 TTE W OR WO FOL WCON,DOPPLER: HCPCS

## 2021-03-23 PROCEDURE — 82962 GLUCOSE BLOOD TEST: CPT

## 2021-03-23 PROCEDURE — 80048 BASIC METABOLIC PNL TOTAL CA: CPT

## 2021-03-23 PROCEDURE — 36415 COLL VENOUS BLD VENIPUNCTURE: CPT

## 2021-03-23 PROCEDURE — 83735 ASSAY OF MAGNESIUM: CPT

## 2021-03-23 PROCEDURE — 92610 EVALUATE SWALLOWING FUNCTION: CPT

## 2021-03-23 RX ORDER — INSULIN LISPRO 100 [IU]/ML
INJECTION, SOLUTION INTRAVENOUS; SUBCUTANEOUS
Status: DISCONTINUED | OUTPATIENT
Start: 2021-03-23 | End: 2021-03-25 | Stop reason: HOSPADM

## 2021-03-23 RX ORDER — AMLODIPINE BESYLATE 5 MG/1
5 TABLET ORAL DAILY
Status: DISCONTINUED | OUTPATIENT
Start: 2021-03-23 | End: 2021-03-24

## 2021-03-23 RX ORDER — OXYCODONE HYDROCHLORIDE 5 MG/1
5 TABLET ORAL
Status: DISCONTINUED | OUTPATIENT
Start: 2021-03-23 | End: 2021-03-25 | Stop reason: HOSPADM

## 2021-03-23 RX ORDER — LABETALOL HYDROCHLORIDE 5 MG/ML
10 INJECTION, SOLUTION INTRAVENOUS
Status: DISCONTINUED | OUTPATIENT
Start: 2021-03-23 | End: 2021-03-25 | Stop reason: HOSPADM

## 2021-03-23 RX ORDER — SODIUM CHLORIDE 9 MG/ML
100 INJECTION, SOLUTION INTRAVENOUS CONTINUOUS
Status: DISCONTINUED | OUTPATIENT
Start: 2021-03-23 | End: 2021-03-23

## 2021-03-23 RX ORDER — ENALAPRILAT 1.25 MG/ML
1.25 INJECTION INTRAVENOUS
Status: COMPLETED | OUTPATIENT
Start: 2021-03-23 | End: 2021-03-23

## 2021-03-23 RX ORDER — SODIUM CHLORIDE 9 MG/ML
100 INJECTION, SOLUTION INTRAVENOUS CONTINUOUS
Status: DISCONTINUED | OUTPATIENT
Start: 2021-03-23 | End: 2021-03-25

## 2021-03-23 RX ADMIN — LOSARTAN POTASSIUM 25 MG: 50 TABLET, FILM COATED ORAL at 09:07

## 2021-03-23 RX ADMIN — Medication 10 ML: at 05:16

## 2021-03-23 RX ADMIN — SODIUM CHLORIDE 100 ML/HR: 900 INJECTION, SOLUTION INTRAVENOUS at 16:33

## 2021-03-23 RX ADMIN — HYDROCHLOROTHIAZIDE 25 MG: 25 TABLET ORAL at 09:06

## 2021-03-23 RX ADMIN — Medication 10 ML: at 13:59

## 2021-03-23 RX ADMIN — OXYCODONE 5 MG: 5 TABLET ORAL at 06:04

## 2021-03-23 RX ADMIN — ACETAMINOPHEN 650 MG: 325 TABLET ORAL at 04:03

## 2021-03-23 RX ADMIN — PERFLUTREN 1 ML: 6.52 INJECTION, SUSPENSION INTRAVENOUS at 08:40

## 2021-03-23 RX ADMIN — HYDRALAZINE HYDROCHLORIDE 50 MG: 25 TABLET, FILM COATED ORAL at 04:15

## 2021-03-23 RX ADMIN — MAGNESIUM GLUCONATE 500 MG ORAL TABLET 400 MG: 500 TABLET ORAL at 09:05

## 2021-03-23 RX ADMIN — OXYCODONE 5 MG: 5 TABLET ORAL at 19:51

## 2021-03-23 RX ADMIN — AMLODIPINE BESYLATE 5 MG: 5 TABLET ORAL at 06:06

## 2021-03-23 RX ADMIN — OXYCODONE 5 MG: 5 TABLET ORAL at 14:07

## 2021-03-23 RX ADMIN — ENALAPRILAT 1.25 MG: 1.25 INJECTION INTRAVENOUS at 06:05

## 2021-03-23 RX ADMIN — ACETAMINOPHEN 650 MG: 325 TABLET ORAL at 21:54

## 2021-03-23 RX ADMIN — FENOFIBRATE 160 MG: 160 TABLET ORAL at 09:07

## 2021-03-23 RX ADMIN — Medication 10 ML: at 22:05

## 2021-03-23 RX ADMIN — ASPIRIN 325 MG: 325 TABLET, FILM COATED ORAL at 09:05

## 2021-03-23 RX ADMIN — FAMOTIDINE 20 MG: 20 TABLET, FILM COATED ORAL at 21:54

## 2021-03-23 RX ADMIN — MAGNESIUM GLUCONATE 500 MG ORAL TABLET 400 MG: 500 TABLET ORAL at 16:33

## 2021-03-23 NOTE — PROGRESS NOTES
Patient Vitals for the past 12 hrs:   Temp Pulse Resp BP SpO2   03/23/21 1522 97.2 °F (36.2 °C) 69 18 (!) 146/59 98 %   03/23/21 1208 98.1 °F (36.7 °C) 73 16 (!) 115/55 97 %   03/23/21 0724 97.9 °F (36.6 °C) 68 16 (!) 149/78 100 %   03/23/21 0720    (!) 149/78      STAND screen per protocol. Patient given stroke education booklet. NIH and neuro exams per protocol. PRN oxycodone given x's 1 for pain. MRI per orders. MD Félix Farah notified of CT findings (see previous note). Neuro consult per orders.     Bedside shift report given to Janel Sneed RN

## 2021-03-23 NOTE — PROGRESS NOTES
MD Michelle Hatch notified in person about following CT findings called by MD:  1) CTA:  Small anuerysm R internal carotic  2) Peripheral infiltrate RU lung lobe. Recommend covid testing.      Awaiting orders from MD Michelle Hatch

## 2021-03-23 NOTE — PROGRESS NOTES
Problem: Patient Education: Go to Patient Education Activity  Goal: Patient/Family Education  Outcome: Progressing Towards Goal  Patient and  given Stroke education booklet.      Problem: TIA/CVA Stroke: 0-24 hours  Goal: Activity/Safety  Outcome: Progressing Towards Goal

## 2021-03-23 NOTE — PROGRESS NOTES
[facility]  History and Physical    Patient ID:  Jeet Sanchez  male  [birth year]  [id number]    Admission Date: 3/22/2021  Chief Complaint: Feeling off balance  Reason for Admission: Hypertensive urgency    ASSESSMENT & PLAN:    Active Hospital Problems    Diagnosis Date Noted    Ataxia 03/23/2021    Acute renal failure superimposed on stage 3 chronic kidney disease ([facility] 03/22/2021    Hypertensive urgency 03/22/2021    Type 2 diabetes mellitus without complication, without long-term current use of insulin ([address] 11/03/2020       # Hypertensive urgency (3/22/2021)  Continue home medications, add HCTZ daily and add hydralazine as needed    March 23, 2021: Blood pressure much improved. A as needed labetalol. Will hold losartan secondary to MARYANN. # Ataxia: We will get a CVA work-up with MRI, echocardiogram, full labs, PT/OT evaluations. He is well beyond any timeline for TPA or intervention and his CTs in the ER were negative    March 23, 2021: Gait was not checked on patient's request.  MRI brain pending. Neurology consultation. 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 Dr. Sotero Hardin recommended outpatient work-up. -Speech resolved       # Acute renal failure superimposed on stage 3 chronic kidney disease ([facility]    IV fluids and monitor labs; will continue his losartan, but if his renal failure worsens, will consider discontinuing or asking nephrology for their input    March 23, 2021: Will hold losartan. Creatinine function improving.  -Not sure if patient has CKD at baseline but will monitor. #Abnormal CT finding: Infiltrate on CT neck. No respiratory symptoms. Patient has known Covid infection treated and end of the January. Residual infiltrate from previous infection.  -Will monitor for any symptoms.       # Type 2 diabetes mellitus without long-term current use of insulin ([facility] (11/3/2020)  Patient reports he has never been started on diabetic medications but  was told that he was borderline diabetic. Will place on sliding scale insulin protocol    #Positive serology for hepatitis C with negative RNA: Based on review of record he already has appointment with gastroenterologist as outpatient. Disposition: admit to observation, remote telemetry  Diet: Diabetic  VTE ppx: Lovenox   GI ppx: Pepcid   CODE STATUS: DNR and he has a living will and power of  papers  Surrogate decision-maker: Partner    March 23, 2021: Updated patient's partner on patient's request.  DNR verified. Both verbalized understanding that patient condition meditated in spite of treatment      HISTORY OF PRESENT ILLNESS:  Patient was discussed with the ER provider prior to seeing the patient. Patient is a 76 y.o. male with medical h/o hypertension and borderline diabetes, who presented to [facility] ED with \"feeling off balance\". He reports that he is also falling to the right side. The symptoms started yesterday and have persisted throughout today. His partner also notes that he has been having rambling conversations and some slurred speech. He also had the symptoms happen last week, and he went to [facility] for evaluation. He had a head CT done that was negative. At that time, his systolic blood pressure was over 200. He denies fever/chills, NVD, shortness of breath, chest pain, abdominal pain, focal weaknesses, focal numbness or tingling, difficulty with speech or swallow. March 23, 2021: Patient is extremely hard of hearing. Read lips intermittently. I have used pen and paper to communicate with the patient. As per patient his speech is back to normal.  Has some headache which is complaining as chronic headache and bilateral.  Denies any other abnormal sensation at present. No chest pain, shortness of breath, palpitation, cough, nausea or vomiting. Rest review of system negative except mentioned above.     PHYSICAL EXAM:    Patient Vitals for the past 24 hrs:   Temp Pulse Resp BP SpO2 03/23/21 0724 97.9 °F (36.6 °C) 68 16 (!) 149/78 100 %   03/23/21 0720    (!) 149/78    03/23/21 0647    (!) 170/104    03/23/21 0544    (!) 177/84    03/23/21 0404 97.4 °F (36.3 °C) 68 20 (!) 174/81 96 %   03/23/21 0017 98 °F (36.7 °C) 67 18 (!) 166/59 99 %   03/22/21 2033 97.9 °F (36.6 °C) 65 18 (!) 143/119 100 %   03/22/21 1922     99 %   03/22/21 1920    (!) 155/67    03/22/21 1840  60 18 (!) 146/63 97 %   03/22/21 1753  74 18 (!) 188/76 98 %   03/22/21 1535 97.5 °F (36.4 °C) 72 16 (!) 192/74 97 %     Visit Vitals  BP (!) 149/78 (BP 1 Location: Left arm, BP Patient Position: At rest)   Pulse 68   Temp 97.9 °F (36.6 °C)   Resp 16   Ht 5' 10\" (1.778 m)   Wt 63.5 kg (140 lb)   SpO2 100%   BMI 20.09 kg/m²       General:    No acute distress. Eyes:  PERRL; EOMI; sclera normal/non-icteric  HENT:  Normocephalic, without obvious abnormality; Oropharynx is clear with tacky mucous membranes   Very hard of hearing, but can read lips  Resp:    Clear to auscultation bilaterally. Resp are even and unlabored  CVS/Heart: Regular rate and rhythm,  No LE edema    GI:    Soft, non-tender. Not distended. Bowel sounds normal.     Musc/SK: Muscle strength is appropriate for age/condition; No cyanosis. No clubbing  Skin:  No obvious rash/lesions  Neurologic: Extremely hard of hearing. Pronator drift negative. Extraocular muscle seems intact. Moving all 4 extremities without any localized weakness. Psych: A AOx3.   Normal behavior noted          Intake/Output last 3 shifts:    Date 03/22/21 0700 - 03/23/21 0659 03/23/21 0700 - 03/24/21 0659   Shift 0700-1859 1900-0659 24 Hour Total 1405-3229 5605-4599 24 Hour Total   INTAKE   I.V.(mL/kg/hr) 100(0.1)  100(0.1) 400  400     I.V.    400  400     Volume (sodium chloride 0.9 % bolus infusion 100 mL) 100  100      Shift Total(mL/kg) 100(1.5)  100(1.6) 400(6.3)  400(6.3)   OUTPUT   Urine(mL/kg/hr)           Urine Occurrence(s)    2 x  2 x   Shift Total(mL/kg)   100 400  400   Weight (kg) 67.1 63.5 63.5 63.5 63.5 63.5       Labs:  CMP:   Lab Results   Component Value Date/Time     03/23/2021 05:23 AM    K 4.7 03/23/2021 05:23 AM     03/23/2021 05:23 AM    CO2 28 03/23/2021 05:23 AM    AGAP 4 (L) 03/23/2021 05:23 AM     (H) 03/23/2021 05:23 AM    BUN 31 (H) 03/23/2021 05:23 AM    CREA 1.54 (H) 03/23/2021 05:23 AM    GFRAA 57 (L) 03/23/2021 05:23 AM    GFRNA 47 (L) 03/23/2021 05:23 AM    CA 9.8 03/23/2021 05:23 AM    MG 1.7 (L) 03/23/2021 05:23 AM    ALB 4.3 03/22/2021 04:36 PM    TBILI 0.4 03/22/2021 04:36 PM    TP 7.7 03/22/2021 04:36 PM    GLOB 3.4 03/22/2021 04:36 PM    AGRAT 1.3 03/22/2021 04:36 PM    ALT 38 03/22/2021 04:36 PM         CBC:    Lab Results   Component Value Date/Time    WBC 7.3 03/23/2021 05:23 AM    HGB 13.3 (L) 03/23/2021 05:23 AM    HCT 41.1 03/23/2021 05:23 AM     03/23/2021 05:23 AM       No results found for: INR, PTMR, PTP, PT1, PT2, INREXT, INREXT    ABG:  No results found for: PH, PHI, PCO2, PCO2I, PO2, PO2I, HCO3, HCO3I, FIO2, FIO2I        No results found for: CPK, RCK1, RCK2, RCK3, RCK4, CKMB, CKNDX, CKND1, TROPT, TROIQ, BNPP, BNP    Imaging & Other Studies:  Ct Perf W Cbf    Result Date: 3/22/2021  EXAMINATION: CT PERFUSION DATE: 3/22/2021 5:26 PM ACCESSION NUMBER:  [id number] INDICATION: : acute neuro changes, possible LVAO COMPARISON: CT head and CTA Head and neck same-day TECHNIQUE: CT perfusion of the brain was obtained after the administration of intravenous contrast. Perfusion maps and perfusion analysis output were generated using the VIZ perfusion processing software algorithm. Radiation dose reduction techniques were used for this study:  Our CT scanners use one or all of the following: Automated exposure control, adjustment of the mA and/or kVp according to patient's size, iterative reconstruction. FINDINGS: Appropriate input and output selection. Mild patient motion.  Appropriate time function curves. VIZ Output Values: CBF < 30% volume (best correlation with core infarct volume without overcalls): 0 ml (core infarction volume greater than 50 cc associated with poor outcomes) Tmax > 6 seconds: 0 ml Tmax/CBF Mismatch Volume: 0 ml Tmax/CBF Mismatch Ratio: N/A Hypoperfusion Intensity Ratio (Tmax > 10 seconds / Tmax > 6 seconds): 0 (values greater than 0.5 associated with poor outcome) Tmax > 10 seconds Volume: 0 ml (volume greater than 100 mL is associated with poor outcome)     No quantitative mismatch perfusion     Ct Code Neuro Head Wo Contrast    Result Date: 3/22/2021  Noncontrast head CT Clinical Indication: Acute code stroke evaluation. Right upper extremity paresthesias, gait imbalance, generalized headache and cardiac palpitations onset upon awakening this morning. Technique: Noncontrast axial images were obtained through the brain. All CT scans at this location are performed using dose modulation techniques as appropriate including the following: Automated exposure control, adjustment of the MA and/or kV according to patient's size, or use of iterative reconstruction technique. Reformatted coronal images obtained to further evaluate bones, soft tissues, extra-axial spaces. Comparison: None available Findings: There is no acute intracranial hemorrhage, hydrocephalus, intra-axial mass, or mass-effect. There is no CT evidence of acute large artery territorial infarction or abnormal extra-axial fluid collection. The mastoid air cells and paranasal sinuses are clear where imaged. No displaced skull fractures are present. No CT evidence of acute intracranial abnormality.       EKG Results     Procedure 720 Value Units Date/Time    EKG, 12 LEAD, INITIAL [[id number]] Collected: 03/22/21 1613    Order Status: Completed Updated: 03/22/21 2041     Ventricular Rate 70 BPM      Atrial Rate 70 BPM      P-R Interval 156 ms      QRS Duration 82 ms      Q-T Interval 362 ms      QTC Calculation (Bezet) 390 ms Calculated P Axis 60 degrees      Calculated R Axis 21 degrees      Calculated T Axis 56 degrees      Diagnosis --     Sinus rhythm with frequent Premature ventricular complexes  Otherwise normal ECG  No previous ECGs available  Confirmed by Smith Wilkes ([id number]) on 3/22/2021 8:41:20 PM            Active Problems:    Patient Active Problem List    Diagnosis Date Noted    Ataxia 03/23/2021    Acute renal failure superimposed on stage 3 chronic kidney disease ([facility] 03/22/2021    Hypertensive urgency 03/22/2021    Type 2 diabetes mellitus without complication, without long-term current use of insulin ([facility] 11/03/2020    Bilateral hearing loss 01/09/2018    Essential hypertension 01/27/2014         Katia Salguero MD  [facility]  3/23/2021

## 2021-03-23 NOTE — CONSULTS
Request for consultation received.   It is noted that the patient is extremely hard of hearing and that makes communication by telemedicine extremely difficult and unlikely to add meaningful to the patient's work-up and management    I agree with the studies that have been    I will review the situation tomorrow when MRI is available for my perusal    We will then make appropriate comments    No charge for the above note

## 2021-03-23 NOTE — PROGRESS NOTES
Problem: Mobility Impaired (Adult and Pediatric)  Goal: *Acute Goals and Plan of Care (Insert Text)  Description:   LTG:  (1.)Mr. Анна Day will move from supine to sit and sit to supine  in bed with INDEPENDENT within 5 treatment day(s). (2.)Mr. Анна Day will transfer from bed to chair and chair to bed with SUPERVISION using the least restrictive device within 5 treatment day(s). (3.)Mr. Анна Day will ambulate with SUPERVISION for 650 feet with the least restrictive device within 5 treatment day(s). (4.)Mr. Анна Day will ambulate up/down 14 steps with B handrails with supervision within 5 treatment days. ________________________________________________________________________________________________      Outcome: Progressing Towards Goal     PHYSICAL THERAPY: Initial Assessment and AM 3/23/2021  OBSERVATION: PT Visit Days : 1  [place] / [place] / Product Type: Medicare /       NAME/AGE/GENDER: Britton Sinclair is a 76 y.o. male   PRIMARY DIAGNOSIS: Hypertensive urgency [I16.0]  Acute on chronic renal failure ([facility] [N17.9, N18.9] Hypertensive urgency Hypertensive urgency        ICD-10: Treatment Diagnosis:    Generalized Muscle Weakness (M62.81)  Difficulty in walking, Not elsewhere classified (R26.2)   Precaution/Allergies:  Codeine      ASSESSMENT:     Mr. Анна Day presents with above diagnosis. Patient reports ongoing balance issues which he attributes to [facility] age\". He notes numerous episodes of bumping into people or objects. Patient has been working as a  and driving. Patient currently demonstrates generalized weakness and imbalance with gait.   He demonstrates multiple path deviations with gait, predominantly to the R.  He bumped into the walls, corners, and carts when walking around the 3rd floor-sometimes while looking straight ahead and sometimes while turning to talk to therapist.  Patient would benefit from acute therapy to address above deficits as well as OPPT at d/c. Patient in agreement and SW notified. This section established at most recent assessment   PROBLEM LIST (Impairments causing functional limitations):  Decreased Strength  Decreased ADL/Functional Activities  Decreased Transfer Abilities  Decreased Ambulation Ability/Technique  Decreased Balance   INTERVENTIONS PLANNED: (Benefits and precautions of physical therapy have been discussed with the patient.)  Balance Exercise  Bed Mobility  Family Education  Gait Training  Home Exercise Program (HEP)  Therapeutic Activites  Therapeutic Exercise/Strengthening     TREATMENT PLAN: Frequency/Duration: daily for duration of hospital stay  Rehabilitation Potential For Stated Goals: Good     REHAB RECOMMENDATIONS (at time of discharge pending progress):    Placement: It is my opinion, based on this patient's performance to date, that Mr. Amilcar Hobson may benefit from [address] after discharge due to the functional deficits listed above that are likely to improve with skilled rehabilitation because because he/she will benefit from the individualized approach tailored to his/her deficits. Equipment:   None at this time              HISTORY:   History of Present Injury/Illness (Reason for Referral):  Patient is a 76 y.o. male with medical h/o hypertension and borderline diabetes, who presented to [facility] with \"feeling off balance\". He reports that he is also falling to the right side. The symptoms started yesterday and have persisted throughout today. His partner also notes that he has been having rambling conversations and some slurred speech. He also had the symptoms happen last week, and he went to [facility] for evaluation. He had a head CT done that was negative. At that time, his systolic blood pressure was over 200. He denies fever/chills, NVD, shortness of breath, chest pain, abdominal pain, focal weaknesses, focal numbness or tingling, difficulty with speech or swallow.     Past Medical History/Comorbidities:   Mr. Say Taylor  has a past medical history of CAD (coronary artery disease), Cancer ([address] Hypertension, and Ill-defined condition. Mr. Say Taylor  has no past surgical history on file. Social History/Living Environment:   Home Environment: Private residence  # Steps to Enter: 9  Rails to Enter: Yes  Hand Rails : Bilateral  One/Two Story Residence: Other (Comment)(3 story)  # of Interior Steps: 15  Interior Rails: Both  Living Alone: No  Support Systems: Spouse/Significant Other/Partner  Patient Expects to be Discharged to[employer] residence  Current DME Used/Available at Home: Walker, rolling, [place], straight  Tub or Shower Type: Shower  Prior Level of Function/Work/Activity:  Independent but history of imbalance for weeks/months. Number of Personal Factors/Comorbidities that affect the Plan of Care: 0: LOW COMPLEXITY   EXAMINATION:   Most Recent Physical Functioning:   Gross Assessment:  AROM: Within functional limits  Strength: Generally decreased, functional  Coordination: Generally decreased, functional               Posture:     Balance:  Sitting: Intact  Standing: Impaired  Standing - Static: Good  Standing - Dynamic : Fair Bed Mobility:  Supine to Sit: Stand-by assistance  Wheelchair Mobility:     Transfers:  Sit to Stand: Minimum assistance  Stand to Sit: Stand-by assistance  Bed to Chair: Stand-by assistance;Contact guard assistance  Gait:     Speed/Debby: Pace decreased (<100 feet/min)  Step Length: Left shortened;Right shortened  Gait Abnormalities: Decreased step clearance; Path deviations(deviates to R)  Distance (ft): 650 Feet (ft)  Ambulation - Level of Assistance: Contact guard assistance  Interventions: Safety awareness training;Verbal cues      Body Structures Involved:  Muscles Body Functions Affected: Movement Related Activities and Participation Affected:   Mobility   Number of elements that affect the Plan of Care: 3: MODERATE COMPLEXITY   CLINICAL PRESENTATION: Presentation: Stable and uncomplicated: LOW COMPLEXITY   CLINICAL DECISION MAKIN[address] [place] [address] AM-PAC 6 Clicks   Basic Mobility Inpatient Short Form  How much difficulty does the patient currently have. .. Unable A Lot A Little None   1. Turning over in bed (including adjusting bedclothes, sheets and blankets)? [] 1   [] 2   [x] 3   [] 4   2. Sitting down on and standing up from a chair with arms ( e.g., wheelchair, bedside commode, etc.)   [] 1   [] 2   [x] 3   [] 4   3. Moving from lying on back to sitting on the side of the bed? [] 1   [] 2   [x] 3   [] 4   How much help from another person does the patient currently need. .. Total A Lot A Little None   4. Moving to and from a bed to a chair (including a wheelchair)? [] 1   [] 2   [x] 3   [] 4   5. Need to walk in hospital room? [] 1   [] 2   [x] 3   [] 4   6. Climbing 3-5 steps with a railing? [] 1   [] 2   [x] 3   [] 4   © , Trustees of [address] [place] [address], under license to [employer]. All rights reserved      Score:  Initial: 18 Most Recent: X (Date: -- )    Interpretation of Tool:  Represents activities that are increasingly more difficult (i.e. Bed mobility, Transfers, Gait). Medical Necessity:     Patient is expected to demonstrate progress in   strength, balance, and coordination   to   increase independence with mobility. .  Reason for Services/Other Comments:  Patient continues to require skilled intervention due to   Imbalance affecting mobility. .   Use of outcome tool(s) and clinical judgement create a POC that gives a: Clear prediction of patient's progress: LOW COMPLEXITY            TREATMENT:   (In addition to Assessment/Re-Assessment sessions the following treatments were rendered)   Pre-treatment Symptoms/Complaints:  Patient agreeable. Pain: Initial:   Pain Intensity 1: 0  Post Session:  0     Therapeutic Activity: (    10 minutes):   Therapeutic activities including Chair transfers and Ambulation on level ground to improve mobility, strength, balance, and coordination. Required minimal Safety awareness training;Verbal cues to promote dynamic balance in standing. Braces/Orthotics/Lines/Etc:   IV  O2 Device: Room air  Treatment/Session Assessment:    Response to Treatment:  Patient participated well. Agreeable to OPPT. Interdisciplinary Collaboration:   Physical Therapist  Occupational Therapist  Registered Nurse    After treatment position/precautions:   Up in chair  Bed/Chair-wheels locked  Call light within reach   Compliance with Program/Exercises: Will assess as treatment progresses  Recommendations/Intent for next treatment session: \"Next visit will focus on advancements to more challenging activities and reduction in assistance provided\".   Total Treatment Duration:  PT Patient Time In/Time Out  Time In: 1040  Time Out: [address] [facility] EMILIANO Collins

## 2021-03-23 NOTE — PROGRESS NOTES
Problem: Self Care Deficits Care Plan (Adult)  Goal: *Acute Goals and Plan of Care (Insert Text)  Outcome: Progressing Towards Goal     OCCUPATIONAL THERAPY: Initial Assessment, Daily Note, Discharge, and AM 3/23/2021  OBSERVATION:    [place] / Plan[place] B / Product Type: Medicare /      NAME/AGE/GENDER: Fadumo Roca is a 76 y.o. male   PRIMARY DIAGNOSIS:  Hypertensive urgency [I16.0]  Acute on chronic renal failure ([facility] [address] [N17.9, N18.9] Hypertensive urgency Hypertensive urgency        ICD-10: Treatment Diagnosis:    · Other lack of cordination (R27.8)   Precautions/Allergies:    Codeine      ASSESSMENT:     Mr. Maliha Bowling  presents with above diagnosis. Pt presents independent with self care tasks. Pt contact guard to stand by for functional mobility. No skilled OT indicated at this time. Will discharge OT. PT will continue to see pt for balance and functional mobility. Pt independent getting dressed this am.  This section established at most recent assessment   PROBLEM LIST (Impairments causing functional limitations):  1. INTERVENTIONS PLANNED: (Benefits and precautions of occupational therapy have been discussed with the patient.)     TREATMENT PLAN: Frequency/Duration: discharge OT. Rehabilitation Potential For Stated Goals: discharge OT     REHAB RECOMMENDATIONS (at time of discharge pending progress):    Placement: It is my opinion, based on this patient's performance to date, that Mr. Maliha Bowling may benefit from being discharged with NO further skilled therapy due to a proven ability to function at baseline. Equipment:    None at this time              OCCUPATIONAL PROFILE AND HISTORY:   History of Present Injury/Illness (Reason for Referral): Hypertensive urgency  Past Medical History/Comorbidities:   Mr. Maliha Bowling  has a past medical history of CAD (coronary artery disease), Cancer ([facility] [address] Hypertension, and Ill-defined condition.   Mr. Maliha Bowling  has no past surgical history on file.  Social History/Living Environment:   Home Environment: Private residence  # Steps to Enter: 9  Rails to Enter: Yes  Hand Rails : Bilateral  One/Two Story Residence: Other (Comment)(3 story)  # of Interior Steps: 14  Interior Rails: Both  Living Alone: No  Support Systems: Spouse/Significant Other/Partner  Patient Expects to be Discharged to[de-identified] Private residence  Current DME Used/Available at Home: Charna Rajni, rolling, Cane, straight  Tub or Shower Type: Shower  Prior Level of Function/Work/Activity:  independent     Number of Personal Factors/Comorbidities that affect the Plan of Care: Brief history (0):  LOW COMPLEXITY   ASSESSMENT OF OCCUPATIONAL PERFORMANCE[de-identified]   Activities of Daily Living:   Basic ADLs (From Assessment) Complex ADLs (From Assessment)   Feeding: Independent  Oral Facial Hygiene/Grooming: Independent  Bathing: Independent  Upper Body Dressing: Independent  Lower Body Dressing: Independent  Toileting: Independent     Grooming/Bathing/Dressing Activities of Daily Living     Cognitive Retraining  Safety/Judgement: Awareness of environment                 Functional Transfers  Bathroom Mobility: Contact guard assistance;Stand-by assistance  Toilet Transfer : Contact guard assistance;Stand-by assistance  Shower Transfer: Stand-by assistance;Contact guard assistance     Bed/Mat Mobility  Supine to Sit: Stand-by assistance  Sit to Stand: Minimum assistance  Stand to Sit: Stand-by assistance  Bed to Chair: Stand-by assistance;Contact guard assistance     Most Recent Physical Functioning:   Gross Assessment:  AROM: Within functional limits  Strength:  Within functional limits(BUE)  Coordination: Within functional limits(BUE)               Posture:     Balance:  Sitting: Intact  Standing: Impaired  Standing - Static: Good  Standing - Dynamic : Fair Bed Mobility:  Supine to Sit: Stand-by assistance  Wheelchair Mobility:     Transfers:  Sit to Stand: Minimum assistance  Stand to Sit: Stand-by assistance  Bed to Chair: Stand-by assistance;Contact guard assistance            Patient Vitals for the past 6 hrs:   BP BP Patient Position SpO2 Pulse   21 0647 (!) 170/104      21 0720 (!) 149/78      21 0724 (!) 149/78 At rest 100 % 68   21 1208 (!) 115/55 At rest;Sitting 97 % 73       Mental Status  Neurologic State: Alert  Orientation Level: Oriented X4  Cognition: Follows commands  Perception: Appears intact  Perseveration: No perseveration noted  Safety/Judgement: Awareness of environment                          Physical Skills Involved:  1. Balance  2. Strength  3. Activity Tolerance Cognitive Skills Affected (resulting in the inability to perform in a timely and safe manner): 1. none Psychosocial Skills Affected:  1. none   Number of elements that affect the Plan of Care: 1-3:  LOW COMPLEXITY   CLINICAL DECISION MAKIN[address] [place] [id number] AM-PAC 6 Clicks   Daily Activity Inpatient Short Form  How much help from another person does the patient currently need. .. Total A Lot A Little None   1. Putting on and taking off regular lower body clothing? [] 1   [] 2   [] 3   [x] 4   2. Bathing (including washing, rinsing, drying)? [] 1   [] 2   [] 3   [x] 4   3. Toileting, which includes using toilet, bedpan or urinal?   [] 1   [] 2   [] 3   [x] 4   4. Putting on and taking off regular upper body clothing? [] 1   [] 2   [] 3   [x] 4   5. Taking care of personal grooming such as brushing teeth? [] 1   [] 2   [] 3   [x] 4   6. Eating meals? [] 1   [] 2   [] 3   [x] 4   © , Trustees of [address] [place] [id number], under license to [employer]. All rights reserved      Score:  Initial: 24 Most Recent: X (Date: -- )    Interpretation of Tool:  Represents activities that are increasingly more difficult (i.e. Bed mobility, Transfers, Gait).     Medical Necessity:     · Discharge OT  Reason for Services/Other Comments:  · Discharge OT   Use of outcome tool(s) and clinical judgement create a POC that gives a: LOW COMPLEXITY         TREATMENT:   (In addition to Assessment/Re-Assessment sessions the following treatments were rendered)     Pre-treatment Symptoms/Complaints:  tolerated evaluation  Pain: Initial:   Pain Intensity 1: 0  Post Session:  0     Self Care: (10): Procedure(s) (per grid) utilized to improve and/or restore self-care/home management as related to dressing. Required minimal verbal cueing to facilitate activities of daily living skills and compensatory activities. Assessment/Reassessment (5)    Braces/Orthotics/Lines/Etc:   · IV  · O2 Device: Room air  Treatment/Session Assessment:    · Response to Treatment:  tolerated well  · Interdisciplinary Collaboration:   o Physical Therapist  o Occupational Therapist  o Registered Nurse  · After treatment position/precautions:   o Up in chair  o Bed/Chair-wheels locked  o Call light within reach   · Compliance with Program/Exercises: discharge OT.   · Recommendations/Intent for next treatment session:  discharge OT  Total Treatment Duration:  OT Patient Time In/Time Out  Time In: 1100  Time Out: 25 [facility]

## 2021-03-23 NOTE — PROGRESS NOTES
Problem: Falls - Risk of  Goal: *Absence of Falls  Description: Document Carlos Jones Fall Risk and appropriate interventions in the flowsheet.   Outcome: Progressing Towards Goal  Note: Fall Risk Interventions:            Medication Interventions: Evaluate medications/consider consulting pharmacy, Patient to call before getting OOB, Teach patient to arise slowly

## 2021-03-23 NOTE — PROGRESS NOTES
Care Management Interventions  PCP Verified by CM: Yes(Jerson)  Mode of Transport at Discharge: ()  Transition of Care Consult (CM Consult): Discharge Planning  Discharge Durable Medical Equipment: No  Physical Therapy Consult: Yes  Occupational Therapy Consult: Yes  Speech Therapy Consult: No  Current Support Network: Lives with Spouse, Own Home  Confirm Follow Up Transport: Family  The Patient and/or Patient Representative was Provided with a Choice of Provider and Agrees with the Discharge Plan?: Yes  Freedom of Choice List was Provided with Basic Dialogue that Supports the Patient's Individualized Plan of Care/Goals, Treatment Preferences and Shares the Quality Data Associated with the Providers?: Yes  Discharge Location  Discharge Placement: Home with family assistance      SW met with patient and  Marengo Locket \"Jb\" Miranda Weaver) while social distancing w/appropriate PPE. Patient is independent with ADLs (still drives) and requires no DME for ambulation. Patient has leftover DME from a family member, including a rolling walker and cane (does not use). Patient lives with his  who is available for support/assistance. Patient's PCP is Cassy Donaldson. Patient verbalized preference for outpatient PT with [facility]. Referral faxed to outpatient PT (P: [phone], F: [phone]). Outpatient Therapy appointment: 4/1 @ 9:30am  [address], [address] [place] [address]    (Aurelia'r also spoke with Outpatient Therapy at [facility] and they were able to see patient on 3/29; however patient has 2 doctor's appointments on 3/29. Patient/ agreeable to keep appointment on 4/1). SW will continue to follow.     LOLA Connolly  [address] [facility]   [phone]

## 2021-03-23 NOTE — PROGRESS NOTES
OBSERVATION STATUS  SPEECH LANGUAGE PATHOLOGY: DYSPHAGIA- Initial Assessment and Discharge    NAME/AGE/GENDER: Rosalva Hurst is a 76 y.o. male  DATE: 3/23/2021  PRIMARY DIAGNOSIS: Hypertensive urgency [I16.0]  Acute on chronic renal failure ([facility] [address] [N17.9, N18.9]      ICD-10: Treatment Diagnosis: R13.12 Dysphagia, Oropharyngeal Phase    RECOMMENDATIONS   DIET:    Regular Consistency   Thin Liquids    MEDICATIONS: With liquid     ASPIRATION PRECAUTIONS  · Slow rate of intake  · Small bites/sips  · Upright at 90 degrees during meal     COMPENSATORY STRATEGIES/MODIFICATIONS  · None     RECOMMENDATIONS for CONTINUED SPEECH THERAPY:   YES: Anticipate need for ongoing speech therapy during this hospitalization. ASSESSMENT   Patient presents with oropharyngeal swallow function that is within normal limits. No s/sx of dysphagia identified. Recommend continue regular consistency diet/thin liquids. Meds with liquid rinse. Will follow up for cognitive linguistic assessment next session if clinically indicated by MRI. ADDENDUM: Per chart review, MRI negative for acute infarction. No further speech therapy needs at this time. EDUCATION:  · Recommendations discussed with Patient and Family  CONTINUATION OF SKILLED SERVICES/MEDICAL NECESSITY:   Patient continues to require skilled intervention due to need for cognitive linguistic assessment if clinically indicated by stroke work-up. MRI pending. REHABILITATION POTENTIAL FOR STATED GOALS: Excellent    PLAN    FREQUENCY/DURATION: Assessment of cognitive-linguistic abilities to be completed at next session. Frequency and duration to be determined by findings/recommendations.     - Recommendations for next treatment session: Next treatment session will address assessment of cognitive-linguistic abilities     SUBJECTIVE   Patient alert upright in bed for session. Friendly and pleasant. Extremely hard of hearing. States he reads lips.  at bedside. Oxygen Device: room air  Pain: Pain Scale 1: Numeric (0 - 10)  Pain Intensity 1: 0  Pain Location 1: Head  Pain Intervention(s) 1: Medication (see MAR)    History of Present Injury/Illness: Mr. Doretha Ferguson  has a past medical history of CAD (coronary artery disease), Cancer [address] [address] Hypertension, and Ill-defined condition. Nanette Beckett He also  has no past surgical history on file. PRECAUTIONS/ALLERGIES: Codeine     Problem List:  (Impairments causing functional limitations):  1. Oropharyngeal dysphagia- No symptoms identified    Previous Dysphagia: NONE REPORTED  Diet Prior to Evaluation: Regular/thin    Orientation:  Person  Place  Time  Situation    Cognitive-Linguistic Screening:   Alertness  o Alert   Speech Production:   o mildly slurred, but patient reports baseline due to hearing impairment   Expressive Language:  o Fluent speech and Able to effectively communicate wants and needs   Receptive Language:  o Answers yes/no questions appropriately and Follows commands   Cognition:   o Patient reports decline in short term memory over the past 2-3 months  Prior Level of Function: Lives at home with . Independent with all ADLs. Recommendations: Given results of screening, patient appears to be functioning at baseline. However imaging results are still pending. Will follow up for further assessment as indicated by findings. OBJECTIVE   Oral Motor:   · Labial: No impairment  · Dentition: Intact  · Oral Hygiene: Adequate  · Lingual: No impairment    Dysphagia evaluation:   Patient consumed trials of thin by cup/straw/serial sips and mixed consistency without overt s/sx airway compromise. Politely declined cracker. Timely oral prep and clearance with mixed consistency fruit.      Tool Used: Dysphagia Outcome and Severity Scale (JOSE E)    Score Comments   Normal Diet  [x] 7 With no strategies or extra time needed   Functional Swallow  [] 6 May have mild oral or pharyngeal delay   Mild Dysphagia  [] 5 Which may require one diet consistency restricted    Mild-Moderate Dysphagia  [] 4 With 1-2 diet consistencies restricted   Moderate Dysphagia  [] 3 With 2 or more diet consistencies restricted   Moderate-Severe Dysphagia  [] 2 With partial PO strategies (trials with ST only)   Severe Dysphagia  [] 1 With inability to tolerate any PO safely      Score:  Initial: 7 Most Recent: x (Date 03/23/21 )   Interpretation of Tool: The Dysphagia Outcome and Severity Scale (JOSE E) is a simple, easy-to-use, 7-point scale developed to systematically rate the functional severity of dysphagia based on objective assessment and make recommendations for diet level, independence level, and type of nutrition. Current Medications:   No current facility-administered medications on file prior to encounter. Current Outpatient Medications on File Prior to Encounter   Medication Sig Dispense Refill    losartan (COZAAR) 25 mg tablet Take 25 mg by mouth daily.  magnesium 250 mg tab Take 400 mg by mouth three (3) times daily.  Fenofibrate 50 mg cap Take 50 mg by mouth daily.           INTERDISCIPLINARY COLLABORATION: N/a    After treatment position/precautions:  · Upright in bed  ·  at bedside    Total Treatment Duration:   Time In: 1339  Time Out: [address], CCC-SLP

## 2021-03-23 NOTE — PROGRESS NOTES
03/22/21 2033   Dual Skin Pressure Injury Assessment   Dual Skin Pressure Injury Assessment WDL   Second Care Provider (Based on [address] [place] [address]) Richa Douglas, RN   Skin Integumentary   Skin Integumentary (WDL) WDL    Pressure  Injury Documentation No Pressure Injury Noted-Pressure Ulcer Prevention Initiated   Wound Prevention and Protection Methods   Orientation of Wound Prevention Posterior   Location of Wound Prevention Sacrum/Coccyx   Dressing Present  No   Wound Offloading (Prevention Methods) Bed, pressure reduction mattress

## 2021-03-24 PROBLEM — R44.1 VISUAL HALLUCINATION: Status: ACTIVE | Noted: 2021-03-24

## 2021-03-24 PROBLEM — N17.9 ACUTE ON CHRONIC RENAL FAILURE (HCC): Status: ACTIVE | Noted: 2021-03-24

## 2021-03-24 PROBLEM — I47.1 SVT (SUPRAVENTRICULAR TACHYCARDIA) (HCC): Status: ACTIVE | Noted: 2021-03-24

## 2021-03-24 PROBLEM — N18.9 ACUTE ON CHRONIC RENAL FAILURE (HCC): Status: ACTIVE | Noted: 2021-03-24

## 2021-03-24 PROBLEM — E83.42 HYPOMAGNESEMIA: Status: ACTIVE | Noted: 2021-03-24

## 2021-03-24 LAB
ANION GAP SERPL CALC-SCNC: 5 MMOL/L (ref 7–16)
ATRIAL RATE: 147 BPM
BASOPHILS # BLD: 0.1 K/UL (ref 0–0.2)
BASOPHILS NFR BLD: 1 % (ref 0–2)
BUN SERPL-MCNC: 21 MG/DL (ref 8–23)
CALCIUM SERPL-MCNC: 8.9 MG/DL (ref 8.3–10.4)
CALCULATED R AXIS, ECG10: 15 DEGREES
CALCULATED T AXIS, ECG11: 60 DEGREES
CHLORIDE SERPL-SCNC: 110 MMOL/L (ref 98–107)
CO2 SERPL-SCNC: 24 MMOL/L (ref 21–32)
CREAT SERPL-MCNC: 1.39 MG/DL (ref 0.8–1.5)
DIAGNOSIS, 93000: NORMAL
DIFFERENTIAL METHOD BLD: ABNORMAL
EOSINOPHIL # BLD: 0.2 K/UL (ref 0–0.8)
EOSINOPHIL NFR BLD: 3 % (ref 0.5–7.8)
ERYTHROCYTE [DISTWIDTH] IN BLOOD BY AUTOMATED COUNT: 12.9 % (ref 11.9–14.6)
GLUCOSE BLD STRIP.AUTO-MCNC: 114 MG/DL (ref 65–100)
GLUCOSE BLD STRIP.AUTO-MCNC: 121 MG/DL (ref 65–100)
GLUCOSE BLD STRIP.AUTO-MCNC: 125 MG/DL (ref 65–100)
GLUCOSE BLD STRIP.AUTO-MCNC: 135 MG/DL (ref 65–100)
GLUCOSE SERPL-MCNC: 135 MG/DL (ref 65–100)
HCT VFR BLD AUTO: 36.6 % (ref 41.1–50.3)
HGB BLD-MCNC: 11.8 G/DL (ref 13.6–17.2)
IMM GRANULOCYTES # BLD AUTO: 0 K/UL (ref 0–0.5)
IMM GRANULOCYTES NFR BLD AUTO: 0 % (ref 0–5)
LYMPHOCYTES # BLD: 1.9 K/UL (ref 0.5–4.6)
LYMPHOCYTES NFR BLD: 28 % (ref 13–44)
MAGNESIUM SERPL-MCNC: 1.6 MG/DL (ref 1.8–2.4)
MCH RBC QN AUTO: 28.9 PG (ref 26.1–32.9)
MCHC RBC AUTO-ENTMCNC: 32.2 G/DL (ref 31.4–35)
MCV RBC AUTO: 89.5 FL (ref 79.6–97.8)
MONOCYTES # BLD: 0.7 K/UL (ref 0.1–1.3)
MONOCYTES NFR BLD: 10 % (ref 4–12)
NEUTS SEG # BLD: 4 K/UL (ref 1.7–8.2)
NEUTS SEG NFR BLD: 58 % (ref 43–78)
NRBC # BLD: 0 K/UL (ref 0–0.2)
PHOSPHATE SERPL-MCNC: 2.6 MG/DL (ref 2.3–3.7)
PLATELET # BLD AUTO: 225 K/UL (ref 150–450)
PMV BLD AUTO: 11.9 FL (ref 9.4–12.3)
POTASSIUM SERPL-SCNC: 5.1 MMOL/L (ref 3.5–5.1)
Q-T INTERVAL, ECG07: 294 MS
QRS DURATION, ECG06: 94 MS
QTC CALCULATION (BEZET), ECG08: 460 MS
RBC # BLD AUTO: 4.09 M/UL (ref 4.23–5.6)
SERVICE CMNT-IMP: ABNORMAL
SODIUM SERPL-SCNC: 139 MMOL/L (ref 136–145)
T3FREE SERPL-MCNC: 3.3 PG/ML (ref 2–4.4)
VENTRICULAR RATE, ECG03: 147 BPM
WBC # BLD AUTO: 6.9 K/UL (ref 4.3–11.1)

## 2021-03-24 PROCEDURE — 36415 COLL VENOUS BLD VENIPUNCTURE: CPT

## 2021-03-24 PROCEDURE — 74011250637 HC RX REV CODE- 250/637: Performed by: FAMILY MEDICINE

## 2021-03-24 PROCEDURE — 96375 TX/PRO/DX INJ NEW DRUG ADDON: CPT

## 2021-03-24 PROCEDURE — 99218 HC RM OBSERVATION: CPT

## 2021-03-24 PROCEDURE — 65270000029 HC RM PRIVATE

## 2021-03-24 PROCEDURE — 96376 TX/PRO/DX INJ SAME DRUG ADON: CPT

## 2021-03-24 PROCEDURE — 74011250636 HC RX REV CODE- 250/636: Performed by: INTERNAL MEDICINE

## 2021-03-24 PROCEDURE — 97530 THERAPEUTIC ACTIVITIES: CPT

## 2021-03-24 PROCEDURE — 74011000250 HC RX REV CODE- 250: Performed by: INTERNAL MEDICINE

## 2021-03-24 PROCEDURE — 82962 GLUCOSE BLOOD TEST: CPT

## 2021-03-24 PROCEDURE — 74011250637 HC RX REV CODE- 250/637: Performed by: INTERNAL MEDICINE

## 2021-03-24 PROCEDURE — 85025 COMPLETE CBC W/AUTO DIFF WBC: CPT

## 2021-03-24 PROCEDURE — 84100 ASSAY OF PHOSPHORUS: CPT

## 2021-03-24 PROCEDURE — 83735 ASSAY OF MAGNESIUM: CPT

## 2021-03-24 PROCEDURE — 80048 BASIC METABOLIC PNL TOTAL CA: CPT

## 2021-03-24 RX ORDER — MAGNESIUM SULFATE HEPTAHYDRATE 40 MG/ML
2 INJECTION, SOLUTION INTRAVENOUS ONCE
Status: COMPLETED | OUTPATIENT
Start: 2021-03-24 | End: 2021-03-24

## 2021-03-24 RX ORDER — AMLODIPINE BESYLATE 5 MG/1
5 TABLET ORAL ONCE
Status: COMPLETED | OUTPATIENT
Start: 2021-03-24 | End: 2021-03-24

## 2021-03-24 RX ORDER — AMLODIPINE BESYLATE 10 MG/1
10 TABLET ORAL DAILY
Status: DISCONTINUED | OUTPATIENT
Start: 2021-03-25 | End: 2021-03-25 | Stop reason: HOSPADM

## 2021-03-24 RX ORDER — AMLODIPINE BESYLATE 10 MG/1
10 TABLET ORAL DAILY
Status: DISCONTINUED | OUTPATIENT
Start: 2021-03-24 | End: 2021-03-24

## 2021-03-24 RX ADMIN — MAGNESIUM SULFATE HEPTAHYDRATE 2 G: 40 INJECTION, SOLUTION INTRAVENOUS at 13:17

## 2021-03-24 RX ADMIN — SODIUM CHLORIDE 100 ML/HR: 900 INJECTION, SOLUTION INTRAVENOUS at 21:28

## 2021-03-24 RX ADMIN — MAGNESIUM GLUCONATE 500 MG ORAL TABLET 400 MG: 500 TABLET ORAL at 07:49

## 2021-03-24 RX ADMIN — AMLODIPINE BESYLATE 5 MG: 5 TABLET ORAL at 09:10

## 2021-03-24 RX ADMIN — Medication 10 ML: at 22:07

## 2021-03-24 RX ADMIN — FENOFIBRATE 160 MG: 160 TABLET ORAL at 07:50

## 2021-03-24 RX ADMIN — ASPIRIN 325 MG: 325 TABLET, FILM COATED ORAL at 07:49

## 2021-03-24 RX ADMIN — SODIUM CHLORIDE 100 ML/HR: 900 INJECTION, SOLUTION INTRAVENOUS at 01:34

## 2021-03-24 RX ADMIN — OXYCODONE 5 MG: 5 TABLET ORAL at 00:07

## 2021-03-24 RX ADMIN — OXYCODONE 5 MG: 5 TABLET ORAL at 15:54

## 2021-03-24 RX ADMIN — SODIUM CHLORIDE 100 ML/HR: 900 INJECTION, SOLUTION INTRAVENOUS at 11:07

## 2021-03-24 RX ADMIN — HYDROCHLOROTHIAZIDE 25 MG: 25 TABLET ORAL at 07:50

## 2021-03-24 RX ADMIN — AMLODIPINE BESYLATE 5 MG: 5 TABLET ORAL at 07:49

## 2021-03-24 RX ADMIN — OXYCODONE 5 MG: 5 TABLET ORAL at 07:50

## 2021-03-24 RX ADMIN — LABETALOL HYDROCHLORIDE 10 MG: 5 INJECTION INTRAVENOUS at 09:10

## 2021-03-24 RX ADMIN — Medication 10 ML: at 06:17

## 2021-03-24 RX ADMIN — HYDRALAZINE HYDROCHLORIDE 50 MG: 25 TABLET, FILM COATED ORAL at 15:51

## 2021-03-24 RX ADMIN — Medication 10 ML: at 13:18

## 2021-03-24 NOTE — PROGRESS NOTES
Problem: Mobility Impaired (Adult and Pediatric)  Goal: *Acute Goals and Plan of Care (Insert Text)  Description:   LTG:  (1.)Mr. Roger Sue will move from supine to sit and sit to supine  in bed with INDEPENDENT within 5 treatment day(s). (2.)Mr. Roger Sue will transfer from bed to chair and chair to bed with SUPERVISION using the least restrictive device within 5 treatment day(s). (3.)Mr. Roger Sue will ambulate with SUPERVISION for 650 feet with the least restrictive device within 5 treatment day(s). (4.)Mr. Roger Sue will ambulate up/down 14 steps with B handrails with supervision within 5 treatment days. ________________________________________________________________________________________________      Outcome: Progressing Towards Goal     PHYSICAL THERAPY: Daily Note and AM 3/24/2021  OBSERVATION: PT Visit Days : 2  [place] / [place] / Product Type: Medicare /       NAME/AGE/GENDER: Holly Maria is a 76 y.o. male   PRIMARY DIAGNOSIS: Hypertensive urgency [I16.0]  Acute on chronic renal failure ([facility] [address] [N17.9, N18.9] Hypertensive urgency Hypertensive urgency       ICD-10: Treatment Diagnosis:    · Generalized Muscle Weakness (M62.81)  · Difficulty in walking, Not elsewhere classified (R26.2)   Precaution/Allergies:  Codeine      ASSESSMENT:     Mr. Roger Sue presents with above diagnosis. Patient reports ongoing balance issues which he attributes to [facility] age\". He notes numerous episodes of bumping into people or objects. Patient has been working as a  and driving. Patient currently demonstrates generalized weakness and imbalance with gait.   He demonstrates multiple path deviations with gait, predominantly to the R.  He bumped into the walls, corners, and carts when walking around the 3rd floor-sometimes while looking straight ahead and sometimes while turning to talk to therapist.  Patient would benefit from acute therapy to address above deficits as well as OPPT at d/c. Patient in agreement and SW notified. 3/24/21:  Patient participated well this am.  RN reports some confusion this am.  Patient appeared appropriate orientation wise but he is impulsive and he doesn't hear well-hearing aid on R and needs to be able to read your lips. He did demonstrate better stability with static and dynamic standing today. Less path deviations, though therapist did stand to his R today. He does tend to shuffle his feet with walking but no LOB. Remains appropriate for OPPT. RN did come to room at end of session as monitor room noted HR in the 180s during activity. This section established at most recent assessment   PROBLEM LIST (Impairments causing functional limitations):  1. Decreased Strength  2. Decreased ADL/Functional Activities  3. Decreased Transfer Abilities  4. Decreased Ambulation Ability/Technique  5. Decreased Balance   INTERVENTIONS PLANNED: (Benefits and precautions of physical therapy have been discussed with the patient.)  1. Balance Exercise  2. Bed Mobility  3. Family Education  4. Gait Training  5. Home Exercise Program (HEP)  6. Therapeutic Activites  7. Therapeutic Exercise/Strengthening     TREATMENT PLAN: Frequency/Duration: daily for duration of hospital stay  Rehabilitation Potential For Stated Goals: Good     REHAB RECOMMENDATIONS (at time of discharge pending progress):    Placement: It is my opinion, based on this patient's performance to date, that Mr. Amilcar Hobson may benefit from [address] after discharge due to the functional deficits listed above that are likely to improve with skilled rehabilitation because because he/she will benefit from the individualized approach tailored to his/her deficits.   Equipment:    None at this time              HISTORY:   History of Present Injury/Illness (Reason for Referral):  Patient is a 76 y.o. male with medical h/o hypertension and borderline diabetes, who presented to [facility] [place] [facility] with \"feeling off balance\". He reports that he is also falling to the right side. The symptoms started yesterday and have persisted throughout today. His partner also notes that he has been having rambling conversations and some slurred speech. He also had the symptoms happen last week, and he went to [facility] for evaluation. He had a head CT done that was negative. At that time, his systolic blood pressure was over 200. He denies fever/chills, NVD, shortness of breath, chest pain, abdominal pain, focal weaknesses, focal numbness or tingling, difficulty with speech or swallow. Past Medical History/Comorbidities:   Mr. Анна Day  has a past medical history of CAD (coronary artery disease), Cancer ([address] Hypertension, and Ill-defined condition. Mr. Анна Day  has no past surgical history on file. Social History/Living Environment:   Home Environment: Private residence  # Steps to Enter: 9  Rails to Enter: Yes  Hand Rails : Bilateral  One/Two Story Residence: Other (Comment)(3 story)  # of Interior Steps: 15  Interior Rails: Both  Living Alone: No  Support Systems: Spouse/Significant Other/Partner  Patient Expects to be Discharged to[employer] residence  Current DME Used/Available at Home: Walker, rolling, Ruperto [employer]ary, straight  Tub or Shower Type: Shower  Prior Level of Function/Work/Activity:  Independent but history of imbalance for weeks/months.      Number of Personal Factors/Comorbidities that affect the Plan of Care: 0: LOW COMPLEXITY   EXAMINATION:   Most Recent Physical Functioning:   Gross Assessment:  AROM: Within functional limits  Strength: Generally decreased, functional  Coordination: Generally decreased, functional               Posture:     Balance:  Sitting: Intact  Standing - Static: Good  Standing - Dynamic : Fair(plus) Bed Mobility:  Supine to Sit: Supervision  Wheelchair Mobility:     Transfers:  Sit to Stand: Contact guard assistance  Stand to Sit: Stand-by assistance  Bed to Chair: Stand-by assistance;Contact guard assistance  Gait:     Speed/Debby: Pace decreased (<100 feet/min)  Step Length: Left shortened;Right shortened  Gait Abnormalities: Decreased step clearance; Path deviations  Distance (ft): 650 Feet (ft)  Ambulation - Level of Assistance: Contact guard assistance  Interventions: Safety awareness training;Verbal cues      Body Structures Involved:  1. Muscles Body Functions Affected:  1. Movement Related Activities and Participation Affected:  1. Mobility   Number of elements that affect the Plan of Care: 3: MODERATE COMPLEXITY   CLINICAL PRESENTATION:   Presentation: Stable and uncomplicated: LOW COMPLEXITY   CLINICAL DECISION MAKIN[address] [place] [address] AM-PAC 6 Clicks   Basic Mobility Inpatient Short Form  How much difficulty does the patient currently have. .. Unable A Lot A Little None   1. Turning over in bed (including adjusting bedclothes, sheets and blankets)? [] 1   [] 2   [x] 3   [] 4   2. Sitting down on and standing up from a chair with arms ( e.g., wheelchair, bedside commode, etc.)   [] 1   [] 2   [x] 3   [] 4   3. Moving from lying on back to sitting on the side of the bed? [] 1   [] 2   [x] 3   [] 4   How much help from another person does the patient currently need. .. Total A Lot A Little None   4. Moving to and from a bed to a chair (including a wheelchair)? [] 1   [] 2   [x] 3   [] 4   5. Need to walk in hospital room? [] 1   [] 2   [x] 3   [] 4   6. Climbing 3-5 steps with a railing? [] 1   [] 2   [x] 3   [] 4   © , Trustees of [address] [place] [address], under license to [employer]. All rights reserved      Score:  Initial: 18 Most Recent: X (Date: -- )    Interpretation of Tool:  Represents activities that are increasingly more difficult (i.e. Bed mobility, Transfers, Gait). Medical Necessity:     · Patient is expected to demonstrate progress in   · strength, balance, and coordination  ·  to   · increase independence with mobility.   · .  Reason for Services/Other Comments:  · Patient continues to require skilled intervention due to   · Imbalance affecting mobility. · .   Use of outcome tool(s) and clinical judgement create a POC that gives a: Clear prediction of patient's progress: LOW COMPLEXITY            TREATMENT:   (In addition to Assessment/Re-Assessment sessions the following treatments were rendered)   Pre-treatment Symptoms/Complaints:  Patient agreeable. Pain: Initial:   Pain Intensity 1: 0  Post Session:  0     Therapeutic Activity: (    30 minutes): Therapeutic activities including bed mobility, toilet transfers, hygiene, and ambulation on level ground to improve mobility, strength, balance, and coordination. Required minimal Safety awareness training;Verbal cues to promote dynamic balance in standing. Braces/Orthotics/Lines/Etc:   · IV  · O2 Device: Room air  Treatment/Session Assessment:    · Response to Treatment:  Patient participated well. More steady today but impulsive. · Interdisciplinary Collaboration:   o Physical Therapist  o Registered Nurse  · After treatment position/precautions:   o Bed/Chair-wheels locked  o Caregiver at bedside  o Call light within reach  o RN notified  o sitting edge of bed for breakfast   · Compliance with Program/Exercises: Will assess as treatment progresses  · Recommendations/Intent for next treatment session: \"Next visit will focus on advancements to more challenging activities and reduction in assistance provided\".   Total Treatment Duration:  PT Patient Time In/Time Out  Time In: 0830  Time Out: 0900    Khushboo Gonzalez PT

## 2021-03-24 NOTE — PROGRESS NOTES
MD Sean Hamlin paged:  [phone] [address] [place] Patient is confused this AM.  Is A&O x's 4, however believes he is here for a lung issue, wondered into hallway at 0300, pulled his IV, etc.  This is new for him. Just wanted to make you aware. Thanks.

## 2021-03-24 NOTE — PROGRESS NOTES
MD Elisabeth Mendoza paged:  Pavan Gene - Patient just informed me that he is occasionally seeing people in the room that are not there. However, states he has been seeing things that aren't there for the past couple weeks. Just now learning this. Thanks.

## 2021-03-24 NOTE — PROGRESS NOTES
[facility]ist Progress Note     Name: Rosalva Hurst   Age: 76 y.o. Sex: male  : 1945    MRN:     [id number]    Admit Date:  3/22/2021    Reason for Admission:  Hypertensive urgency [I16.0]  Acute on chronic renal failure ([facility] [address] [N17.9, N18.9]    Assessment & Plan     Hypertensive urgency: improved  - Continue home medications, add HCTZ daily and add hydralazine as needed  - holding losartan due to MARYANN      Ataxia  MRI with age-appropriate brain without acute or recent infarct. 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 Dr. Saba Barrett recommended outpatient work-up. - Echo done. Pending read  - Neurology consulted    Visual Hallucination  - psych eval    Episode of SVT  Resolved after baring down. - Echo pending    Acute renal failure superimposed on stage 3 chronic kidney disease     - c/w IV fluids  - hold losartan     Abnormal CT finding: Infiltrate on CT neck. No respiratory symptoms. Patient has known Covid infection treated and end of the January. Residual infiltrate from previous infection.  -Will monitor for any symptoms.     Type 2 diabetes mellitus without long-term current use of insulin   Patient reports he has never been started on diabetic medications but  was told that he was borderline diabetic.    - sliding scale insulin protocol     Positive serology for hepatitis C with negative RNA: Based on review of record he already has appointment with gastroenterologist as outpatient. Diet: Diabetic  VTE ppx: Lovenox   GI ppx: Pepcid   CODE STATUS: DNR and he has a living will and power of  papers  Surrogate decision-maker: Partner      Diet:  DIET DIABETIC WITH OPTIONS  DVT PPx: lovenox  GI Ppx: pepcid  Code: DNR    Dispo / Discharge Planning: likely outpatient PT    Hospital Course/Subjective:     Please refer to the admission H&P for details of presentation.     In summary, Rosalva Hurst is a 76 y.o. male with medical history significant for      Subjective/24 hr Events (03/24/21) :  Patient is seen and examined at bedside. No acute events reported overnight by nursing staff. Patient was noted to be confused overnight, walking down the lazar. He reports that he realized that he woke up not knowing where he was. He is anxious this AM and worried about being confused in the AM.    He also reports to nursing that he has had visual hallucinations which has been on going for a few weeks. Otherwise, he denies fever, chills, chest pains, shortness of breath, n/v, abdominal pain. Review of Systems: 14 point review of systems is otherwise negative with the exception of the elements mentioned above. Objective:     Patient Vitals for the past 24 hrs:   Temp Pulse Resp BP SpO2   03/24/21 1102 98 °F (36.7 °C) 85 18 (!) 156/61 96 %   03/24/21 0950  77  137/81    03/24/21 0739 98.2 °F (36.8 °C) 66 18 (!) 172/60 94 %   03/24/21 0337 98.3 °F (36.8 °C) 73 17 (!) 158/76 97 %   03/23/21 1933 97.8 °F (36.6 °C) 70 18 101/72 98 %   03/23/21 1522 97.2 °F (36.2 °C) 69 18 (!) 146/59 98 %     Oxygen Therapy  O2 Sat (%): 96 % (03/24/21 1102)  Pulse via Oximetry: 61 beats per minute (03/22/21 1922)  O2 Device: Room air (03/24/21 0700)    Body mass index is 20.09 kg/m². Physical Exam:   General:     alert, awake, no acute distress. Thin  Head:   normocephalic, atraumatic  Eyes, Ears, nose: PERRL, EOMI. Normal conjunctiva  Neck:    supple, non-tender. Trachea midline. Lungs:   CTAB, no wheezing, rhonchi, rales  Cardiac:   RRR, Normal S1 and S2. Abdomen:   Soft, non distended, nontender, +BS  Extremities:   Warm, dry. No edema   Skin:   No rashes, no jaundice  Neuro:  AAOx3. No gross focal neurological deficit.  Gait not assessed   Psychiatric:  No anxiety, calm, cooperative    Data Review:  I have reviewed all labs, meds, and studies from the last 24 hours:    Labs:    Recent Results (from the past 24 hour(s))   GLUCOSE, POC    Collection Time: 03/23/21  4:23 PM Result Value Ref Range    Glucose (POC) 90 65 - 100 mg/dL    Performed by Yodit    GLUCOSE, POC    Collection Time: 03/23/21  9:34 PM   Result Value Ref Range    Glucose (POC) 88 65 - 100 mg/dL    Performed by Valeri    CBC WITH AUTOMATED DIFF    Collection Time: 03/24/21  5:20 AM   Result Value Ref Range    WBC 6.9 4.3 - 11.1 K/uL    RBC 4.09 (L) 4.23 - 5.6 M/uL    HGB 11.8 (L) 13.6 - 17.2 g/dL    HCT 36.6 (L) 41.1 - 50.3 %    MCV 89.5 79.6 - 97.8 FL    MCH 28.9 26.1 - 32.9 PG    MCHC 32.2 31.4 - 35.0 g/dL    RDW 12.9 11.9 - 14.6 %    PLATELET 928 386 - 056 K/uL    MPV 11.9 9.4 - 12.3 FL    ABSOLUTE NRBC 0.00 0.0 - 0.2 K/uL    DF AUTOMATED      NEUTROPHILS 58 43 - 78 %    LYMPHOCYTES 28 13 - 44 %    MONOCYTES 10 4.0 - 12.0 %    EOSINOPHILS 3 0.5 - 7.8 %    BASOPHILS 1 0.0 - 2.0 %    IMMATURE GRANULOCYTES 0 0.0 - 5.0 %    ABS. NEUTROPHILS 4.0 1.7 - 8.2 K/UL    ABS. LYMPHOCYTES 1.9 0.5 - 4.6 K/UL    ABS. MONOCYTES 0.7 0.1 - 1.3 K/UL    ABS. EOSINOPHILS 0.2 0.0 - 0.8 K/UL    ABS. BASOPHILS 0.1 0.0 - 0.2 K/UL    ABS. IMM.  GRANS. 0.0 0.0 - 0.5 K/UL   METABOLIC PANEL, BASIC    Collection Time: 03/24/21  5:20 AM   Result Value Ref Range    Sodium 139 136 - 145 mmol/L    Potassium 5.1 3.5 - 5.1 mmol/L    Chloride 110 (H) 98 - 107 mmol/L    CO2 24 21 - 32 mmol/L    Anion gap 5 (L) 7 - 16 mmol/L    Glucose 135 (H) 65 - 100 mg/dL    BUN 21 8 - 23 MG/DL    Creatinine 1.39 0.8 - 1.5 MG/DL    GFR est AA >60 >60 ml/min/1.73m2    GFR est non-AA 53 (L) >60 ml/min/1.73m2    Calcium 8.9 8.3 - 10.4 MG/DL   MAGNESIUM    Collection Time: 03/24/21  5:20 AM   Result Value Ref Range    Magnesium 1.6 (L) 1.8 - 2.4 mg/dL   PHOSPHORUS    Collection Time: 03/24/21  5:20 AM   Result Value Ref Range    Phosphorus 2.6 2.3 - 3.7 MG/DL   GLUCOSE, POC    Collection Time: 03/24/21  7:25 AM   Result Value Ref Range    Glucose (POC) 121 (H) 65 - 100 mg/dL    Performed by Yodit    EKG, 12 LEAD, INITIAL    Collection Time: 03/24/21  9:19 AM   Result Value Ref Range    Ventricular Rate 147 BPM    Atrial Rate 147 BPM    QRS Duration 94 ms    Q-T Interval 294 ms    QTC Calculation (Bezet) 460 ms    Calculated R Axis 15 degrees    Calculated T Axis 60 degrees    Diagnosis       Supraventricular tachycardia  Incomplete right bundle branch block  Nonspecific ST abnormality  Abnormal ECG  No previous ECGs available  Confirmed by ST ANAHI FRANKLIN MD ()YARELI ([id number]) on 3/24/2021 11:32:44 AM     GLUCOSE, POC    Collection Time: 03/24/21 11:18 AM   Result Value Ref Range    Glucose (POC) 135 (H) 65 - 100 mg/dL    Performed by Yodit        All Micro Results     None          EKG Results     Procedure 720 Value Units Date/Time    EKG, 12 LEAD, INITIAL [[id number]] Collected: 03/24/21 0919    Order Status: Completed Updated: 03/24/21 1132     Ventricular Rate 147 BPM      Atrial Rate 147 BPM      QRS Duration 94 ms      Q-T Interval 294 ms      QTC Calculation (Bezet) 460 ms      Calculated R Axis 15 degrees      Calculated T Axis 60 degrees      Diagnosis --     Supraventricular tachycardia  Incomplete right bundle branch block  Nonspecific ST abnormality  Abnormal ECG  No previous ECGs available  Confirmed by ST ANAHI FRANKLIN MD ()YARELI ([id number]) on 3/24/2021 11:32:44 AM      EKG, 12 LEAD, INITIAL [[id number]] Collected: 03/22/21 1613    Order Status: Completed Updated: 03/22/21 2041     Ventricular Rate 70 BPM      Atrial Rate 70 BPM      P-R Interval 156 ms      QRS Duration 82 ms      Q-T Interval 362 ms      QTC Calculation (Bezet) 390 ms      Calculated P Axis 60 degrees      Calculated R Axis 21 degrees      Calculated T Axis 56 degrees      Diagnosis --     Sinus rhythm with frequent Premature ventricular complexes  Otherwise normal ECG  No previous ECGs available  Confirmed by Sanjuanita Noonan ([id number]) on 3/22/2021 8:41:20 PM            Other Studies:  Mri Brain Wo Cont    Result Date: 3/23/2021  EXAMINATION: BRAIN MRI 3/23/2021 4:01 PM INDICATION: Imbalance. Falling to the right side COMPARISON: CT head, CT perfusion March 22, 2021 TECHNIQUE: Multiplanar multisequence MRI of the brain without intravenous contrast. FINDINGS: No evidence of acute or recent infarct. No evidence of recent hemorrhage. Normal size of ventricles and extra-axial spaces for age. Normal vascular flow voids. Normal cerebral parenchyma signal for age. No calvarial abnormality is seen. Grossly normal orbital structures. Essentially clear paranasal sinuses and mastoid air cells. No abnormality of extracranial soft tissues. Age-appropriate MR brain. No acute or recent infarct. Ct Perf W Cbf    Result Date: 3/22/2021  EXAMINATION: CT PERFUSION DATE: 3/22/2021 5:26 PM ACCESSION NUMBER:  [id number] INDICATION: : acute neuro changes, possible LVAO COMPARISON: CT head and CTA Head and neck same-day TECHNIQUE: CT perfusion of the brain was obtained after the administration of intravenous contrast. Perfusion maps and perfusion analysis output were generated using the VIZ perfusion processing software algorithm. Radiation dose reduction techniques were used for this study:  Our CT scanners use one or all of the following: Automated exposure control, adjustment of the mA and/or kVp according to patient's size, iterative reconstruction. FINDINGS: Appropriate input and output selection. Mild patient motion. Appropriate time function curves.  VIZ Output Values: CBF < 30% volume (best correlation with core infarct volume without overcalls): 0 ml (core infarction volume greater than 50 cc associated with poor outcomes) Tmax > 6 seconds: 0 ml Tmax/CBF Mismatch Volume: 0 ml Tmax/CBF Mismatch Ratio: N/A Hypoperfusion Intensity Ratio (Tmax > 10 seconds / Tmax > 6 seconds): 0 (values greater than 0.5 associated with poor outcome) Tmax > 10 seconds Volume: 0 ml (volume greater than 100 mL is associated with poor outcome)     No quantitative mismatch perfusion     Cta Code Neuro Head And Neck W Cont    Result Date: 3/23/2021  History: Patient's, headache. Balance problems. Tingling in the right hand. Headache Comments: CT ANGIOGRAM OF THE NECK AND CT ANGIOGRAM OF THE [place] was obtained following the administration of IV contrast. IV contrast was administered to evaluate the arterial vasculature. Reformatted images in the coronal and sagittal planes as well as 3-D imaging was obtained and reviewed on a dedicated PACS and [address] Radiation reduction dose techniques were used for the study. Our CT scanner use one or all of the following- Automated exposure control, adjustment of the mA and/or KV according the patient size, iterative reconstruction. All measurements are based upon NASCET criteria if appropriate. This study was analyzed by the [address] [facility] [address]Algorithm Findings: CT ANGIOGRAM OF THE NECK: The arch and proximal great vessels are patent with atherosclerotic changes. The left vertebral artery arises from the arch itself. The right and left carotid bulbs demonstrate no evidence of stenosis. The proximal vertebral arteries are patent, the right is dominant. Opacification is noted in the right upper lobe. The thyroid gland demonstrates a subcentimeter nodule in the left lobe CT ANGIOGRAM OF [place]: The petrous, cavernous, and supraclinoid internal carotid arteries are patent the anterior and middle cerebral arteries are patent. There is an outpouching of contrast measuring 2.5 mm within the clinoid/supraclinoid portion of the right internal carotid artery, this is most consistent with a small aneurysm. The distal vertebral arteries, basilar artery, and posterior cerebral arteries are patent. The dural venous sinuses are patent. 1. 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 Recommend neurointerventional consultation.  2. Opacification, possible infectious infiltrate, noted in the right upper lobe. Given the peripheral location of this opacification cannot exclude a viral pneumonia. These findings were relayed to the charge nurse upon interpretation. DC4 The significant findings in this report have been referred to the Imaging Navigator in order to communicate to the referring provider or his/her designee as outlined in Section II.C.2.a.ii-iii of the ACR Practice Guideline for Communication of Diagnostic Imaging Findings. Ct Code Neuro Head Wo Contrast    Result Date: 3/22/2021  Noncontrast head CT Clinical Indication: Acute code stroke evaluation. Right upper extremity paresthesias, gait imbalance, generalized headache and cardiac palpitations onset upon awakening this morning. Technique: Noncontrast axial images were obtained through the brain. All CT scans at this location are performed using dose modulation techniques as appropriate including the following: Automated exposure control, adjustment of the MA and/or kV according to patient's size, or use of iterative reconstruction technique. Reformatted coronal images obtained to further evaluate bones, soft tissues, extra-axial spaces. Comparison: None available Findings: There is no acute intracranial hemorrhage, hydrocephalus, intra-axial mass, or mass-effect. There is no CT evidence of acute large artery territorial infarction or abnormal extra-axial fluid collection. The mastoid air cells and paranasal sinuses are clear where imaged. No displaced skull fractures are present. No CT evidence of acute intracranial abnormality.           Current Meds:   Current Facility-Administered Medications   Medication Dose Route Frequency    [START ON 3/25/2021] amLODIPine (NORVASC) tablet 10 mg  10 mg Oral DAILY    oxyCODONE IR (ROXICODONE) tablet 5 mg  5 mg Oral Q4H PRN    insulin lispro (HUMALOG) injection   SubCUTAneous AC&HS    labetaloL (NORMODYNE;TRANDATE) injection 10 mg  10 mg IntraVENous Q4H PRN    0.9% sodium chloride infusion  100 mL/hr IntraVENous CONTINUOUS    fenofibrate (LOFIBRA) tablet 160 mg  160 mg Oral DAILY    [Held by provider] losartan (COZAAR) tablet 25 mg  25 mg Oral DAILY    magnesium oxide (MAG-OX) tablet 400 mg  400 mg Oral BID    dextrose 40% (GLUTOSE) oral gel 1 Tube  15 g Oral PRN    glucagon (GLUCAGEN) injection 1 mg  1 mg IntraMUSCular PRN    dextrose (D50W) injection syrg 12.5-25 g  25-50 mL IntraVENous PRN    sodium chloride (NS) flush 5-40 mL  5-40 mL IntraVENous Q8H    sodium chloride (NS) flush 5-40 mL  5-40 mL IntraVENous PRN    ondansetron (ZOFRAN) injection 4 mg  4 mg IntraVENous Q6H PRN    aspirin tablet 325 mg  325 mg Oral DAILY    acetaminophen (TYLENOL) tablet 650 mg  650 mg Oral Q4H PRN    bisacodyL (DULCOLAX) tablet 5 mg  5 mg Oral DAILY PRN    famotidine (PEPCID) tablet 20 mg  20 mg Oral QHS    enoxaparin (LOVENOX) injection 40 mg  40 mg SubCUTAneous Q24H    hydrALAZINE (APRESOLINE) tablet 50 mg  50 mg Oral Q6H PRN    hydroCHLOROthiazide (HYDRODIURIL) tablet 25 mg  25 mg Oral DAILY       Problem List:  Hospital Problems as of 3/24/2021 Never Reviewed          Codes Class Noted - Resolved POA    Ataxia ICD-10-CM: R27.0  ICD-9-CM: 781.3  3/23/2021 - Present Yes        Acute renal failure superimposed on stage 3 chronic kidney disease (HCC) ICD-10-CM: N17.9, N18.30  ICD-9-CM: 584.9, 585.3  3/22/2021 - Present Yes        * (Principal) Hypertensive urgency ICD-10-CM: I16.0  ICD-9-CM: 401.9  3/22/2021 - Present Yes        Type 2 diabetes mellitus without complication, without long-term current use of insulin (HCC) (Chronic) ICD-10-CM: E11.9  ICD-9-CM: 250.00  11/3/2020 - Present Yes               Part of this note was written by using a voice dictation software and the note has been proof read but may still contain some grammatical/other typographical errors.     Signed By: Carmen Burkitt, MD   [employer] [facility]    March 24, 2021  12:16 PM

## 2021-03-24 NOTE — PROGRESS NOTES
Problem: Falls - Risk of  Goal: *Absence of Falls  Description: Document Bard  Fall Risk and appropriate interventions in the flowsheet.   Outcome: Progressing Towards Goal  Note: Fall Risk Interventions:  Mobility Interventions: Communicate number of staff needed for ambulation/transfer, Patient to call before getting OOB, PT Consult for mobility concerns, PT Consult for assist device competence    Mentation Interventions: Bed/chair exit alarm, Door open when patient unattended, Reorient patient, More frequent rounding    Medication Interventions: Teach patient to arise slowly, Patient to call before getting OOB, Evaluate medications/consider consulting pharmacy

## 2021-03-24 NOTE — PROGRESS NOTES
Care Management Interventions  PCP Verified by CM: Yes(Jerson)  Mode of Transport at Discharge: ()  Transition of Care Consult (CM Consult): Discharge Planning  Discharge Durable Medical Equipment: No  Physical Therapy Consult: Yes  Occupational Therapy Consult: Yes  Speech Therapy Consult: No  Current Support Network: Lives with Spouse, Own Home  Confirm Follow Up Transport: Family  The Patient and/or Patient Representative was Provided with a Choice of Provider and Agrees with the Discharge Plan?: Yes  Freedom of Choice List was Provided with Basic Dialogue that Supports the Patient's Individualized Plan of Care/Goals, Treatment Preferences and Shares the Quality Data Associated with the Providers?: Yes  Discharge Location  Discharge Placement: Home with family assistance  Chart reviewed and plan is for pt to return home with family and in need of Outpatient PT. Pt possible d/c tomorrow. See note below. ................... REHAB RECOMMENDATIONS (at time of discharge pending progress):    Placement: It is my opinion, based on this patient's performance to date, that Mr. Eligio Palmer may benefit from [facility] after discharge due to the functional deficits listed above that are likely to improve with skilled rehabilitation because because he/she will benefit from the individualized approach tailored to his/her deficits.   Equipment:   None at this time

## 2021-03-24 NOTE — PROGRESS NOTES
Patient Vitals for the past 12 hrs:   Temp Pulse Resp BP SpO2   03/24/21 1102 98 °F (36.7 °C) 85 18 (!) 156/61 96 %   03/24/21 0950  77  137/81    03/24/21 0739 98.2 °F (36.8 °C) 66 18 (!) 172/60 94 %   03/24/21 0337 98.3 °F (36.8 °C) 73 17 (!) 158/76 97 %     Elevated bp today. Patient's HR went into 170's-180's while walking with PT. Did not immediately come down. Karsten RN at bedside. MD notified. PRN labetalol given for HR and BP. EKG done per MD order. EKG showed SVT in 140's. Patient switched back to NSR with PVC's. Patient's HR will sometimes increase back to 170's with ambulation. MD Cabrera aware. Patient more confused today. Reported to this RN he was hallucinating and seeing a woman in the room. MD Cabrera notified. Psych consult ordered per provider. 2g Mag transfused per orders. PRN oxycodone 5mg po given x's 2 for pain. Patient moved to room 355. PRN hydralazine given for elevated BP. NIH per protocol. Nuero checks per protocol.     Bedside shift report given to Gurmeet Barrera, on-coming RN

## 2021-03-24 NOTE — PROGRESS NOTES
Outreach Follow Up Note    Jeet Sanchez was seen and assessed. Was on the phone with monitor room when they stated patients HR had jumped up to 180's. Upon entering patient room, patient was sitting on side of bed and had just completed therapy with PT. States he is a little short of breath at this time. Primary RN Hi Saha, in to his room at this time. Patient lungs clear upon ausculation, and HR noted to be regular but fast. Dr. John Corriganters at this time and order received for EKG. EKG done by RT and Dr. Jefferson Leader made aware of results. [id number] Patient sitting on bedside toilet and converted back to NSR with HR in the 70's and 80's. Dr. Jefferson Leader at bedside along with primary RN and myself. Will continue to monitor. MEWS Score: 1 (03/24/21 0739)  Vitals:    03/23/21 1522 03/23/21 1933 03/24/21 0337 03/24/21 0739   BP: (!) 146/59 101/72 (!) 158/76 (!) 172/60   Pulse: 69 70 73 66   Resp: 18 18 17 18   Temp: 97.2 °F (36.2 °C) 97.8 °F (36.6 °C) 98.3 °F (36.8 °C) 98.2 °F (36.8 °C)   SpO2: 98% 98% 97% 94%   Weight:       Height:             Pain Assessment  Pain Intensity 1: 6 (03/24/21 0750)  Pain Location 1: Head  Pain Intervention(s) 1: Medication (see MAR)  Patient Stated Pain Goal: 0      Patient reviewed and discussed with primary nurse. There have been no significant clinical changes since the completion of the last dated Outreach assessment. Will continue to follow up per outreach protocol.     Signed By:   Meño Denise RN    March 24, 2021 9:48 AM

## 2021-03-25 VITALS
HEIGHT: 70 IN | SYSTOLIC BLOOD PRESSURE: 146 MMHG | DIASTOLIC BLOOD PRESSURE: 78 MMHG | WEIGHT: 140 LBS | HEART RATE: 67 BPM | TEMPERATURE: 97.9 F | RESPIRATION RATE: 16 BRPM | BODY MASS INDEX: 20.04 KG/M2 | OXYGEN SATURATION: 98 %

## 2021-03-25 LAB
ANION GAP SERPL CALC-SCNC: 3 MMOL/L (ref 7–16)
BASOPHILS # BLD: 0.1 K/UL (ref 0–0.2)
BASOPHILS NFR BLD: 1 % (ref 0–2)
BUN SERPL-MCNC: 17 MG/DL (ref 8–23)
CALCIUM SERPL-MCNC: 8.9 MG/DL (ref 8.3–10.4)
CHLORIDE SERPL-SCNC: 112 MMOL/L (ref 98–107)
CO2 SERPL-SCNC: 24 MMOL/L (ref 21–32)
CREAT SERPL-MCNC: 1.25 MG/DL (ref 0.8–1.5)
DIFFERENTIAL METHOD BLD: ABNORMAL
EOSINOPHIL # BLD: 0.2 K/UL (ref 0–0.8)
EOSINOPHIL NFR BLD: 4 % (ref 0.5–7.8)
ERYTHROCYTE [DISTWIDTH] IN BLOOD BY AUTOMATED COUNT: 13 % (ref 11.9–14.6)
GLUCOSE BLD STRIP.AUTO-MCNC: 88 MG/DL (ref 65–100)
GLUCOSE BLD STRIP.AUTO-MCNC: 92 MG/DL (ref 65–100)
GLUCOSE SERPL-MCNC: 96 MG/DL (ref 65–100)
HCT VFR BLD AUTO: 36.9 % (ref 41.1–50.3)
HGB BLD-MCNC: 11.8 G/DL (ref 13.6–17.2)
IMM GRANULOCYTES # BLD AUTO: 0 K/UL (ref 0–0.5)
IMM GRANULOCYTES NFR BLD AUTO: 0 % (ref 0–5)
LYMPHOCYTES # BLD: 1.6 K/UL (ref 0.5–4.6)
LYMPHOCYTES NFR BLD: 26 % (ref 13–44)
MCH RBC QN AUTO: 28.4 PG (ref 26.1–32.9)
MCHC RBC AUTO-ENTMCNC: 32 G/DL (ref 31.4–35)
MCV RBC AUTO: 88.7 FL (ref 79.6–97.8)
MONOCYTES # BLD: 0.7 K/UL (ref 0.1–1.3)
MONOCYTES NFR BLD: 13 % (ref 4–12)
NEUTS SEG # BLD: 3.3 K/UL (ref 1.7–8.2)
NEUTS SEG NFR BLD: 55 % (ref 43–78)
NRBC # BLD: 0 K/UL (ref 0–0.2)
PLATELET # BLD AUTO: 216 K/UL (ref 150–450)
PMV BLD AUTO: 11.6 FL (ref 9.4–12.3)
POTASSIUM SERPL-SCNC: 4.8 MMOL/L (ref 3.5–5.1)
RBC # BLD AUTO: 4.16 M/UL (ref 4.23–5.6)
SERVICE CMNT-IMP: NORMAL
SERVICE CMNT-IMP: NORMAL
SODIUM SERPL-SCNC: 139 MMOL/L (ref 136–145)
WBC # BLD AUTO: 5.9 K/UL (ref 4.3–11.1)

## 2021-03-25 PROCEDURE — 74011250636 HC RX REV CODE- 250/636: Performed by: HOSPITALIST

## 2021-03-25 PROCEDURE — 85025 COMPLETE CBC W/AUTO DIFF WBC: CPT

## 2021-03-25 PROCEDURE — 80048 BASIC METABOLIC PNL TOTAL CA: CPT

## 2021-03-25 PROCEDURE — 74011250637 HC RX REV CODE- 250/637: Performed by: FAMILY MEDICINE

## 2021-03-25 PROCEDURE — 74011250637 HC RX REV CODE- 250/637: Performed by: INTERNAL MEDICINE

## 2021-03-25 PROCEDURE — 36415 COLL VENOUS BLD VENIPUNCTURE: CPT

## 2021-03-25 PROCEDURE — 74011250637 HC RX REV CODE- 250/637: Performed by: HOSPITALIST

## 2021-03-25 PROCEDURE — 74011250636 HC RX REV CODE- 250/636: Performed by: INTERNAL MEDICINE

## 2021-03-25 PROCEDURE — 82962 GLUCOSE BLOOD TEST: CPT

## 2021-03-25 RX ORDER — AMLODIPINE BESYLATE 10 MG/1
10 TABLET ORAL DAILY
Qty: 30 TAB | Refills: 0 | Status: SHIPPED | OUTPATIENT
Start: 2021-03-26

## 2021-03-25 RX ORDER — MAGNESIUM SULFATE HEPTAHYDRATE 40 MG/ML
2 INJECTION, SOLUTION INTRAVENOUS ONCE
Status: COMPLETED | OUTPATIENT
Start: 2021-03-25 | End: 2021-03-25

## 2021-03-25 RX ORDER — ACETAMINOPHEN 325 MG/1
650 TABLET ORAL ONCE
Status: COMPLETED | OUTPATIENT
Start: 2021-03-25 | End: 2021-03-25

## 2021-03-25 RX ADMIN — FENOFIBRATE 160 MG: 160 TABLET ORAL at 09:26

## 2021-03-25 RX ADMIN — MAGNESIUM SULFATE HEPTAHYDRATE 2 G: 40 INJECTION, SOLUTION INTRAVENOUS at 12:24

## 2021-03-25 RX ADMIN — ACETAMINOPHEN 650 MG: 325 TABLET ORAL at 12:24

## 2021-03-25 RX ADMIN — Medication 10 ML: at 05:52

## 2021-03-25 RX ADMIN — Medication 10 ML: at 13:28

## 2021-03-25 RX ADMIN — ASPIRIN 325 MG: 325 TABLET, FILM COATED ORAL at 09:26

## 2021-03-25 RX ADMIN — HYDROCHLOROTHIAZIDE 25 MG: 25 TABLET ORAL at 09:26

## 2021-03-25 RX ADMIN — AMLODIPINE BESYLATE 10 MG: 10 TABLET ORAL at 09:26

## 2021-03-25 RX ADMIN — SODIUM CHLORIDE 100 ML/HR: 900 INJECTION, SOLUTION INTRAVENOUS at 09:26

## 2021-03-25 NOTE — DISCHARGE SUMMARY
[facility] Discharge Summary    Patient ID:  Avni peña. 1945.  [id number]    Admit date: 3/22/2021  3:31 PM    Discharge date: 03/25/21     Admitting Physician: Gee Badillo MD  Attending Physician: Nuala Sacks, MD  Primary Care Physician: Vince Hall MD     Discharge Physician: Yunier Villareal MD    Discharged Condition: Stable    Indication for Admission:   Chief Complaint   Patient presents with    Headache    Gait Problem        Reason for hospitalization:   Patient Active Problem List   Diagnosis Code    Acute renal failure superimposed on stage 3 chronic kidney disease ([facility] [facility] N17.9, N18.30    Hypertensive urgency I16.0    Bilateral hearing loss H91.93    Essential hypertension I10    Type 2 diabetes mellitus without complication, without long-term current use of insulin ([facility] [address] E11.9    Ataxia R27.0    Visual hallucination R44.1    Hypomagnesemia E83.42    SVT (supraventricular tachycardia) ([facility]) I47.1    Acute on chronic renal failure (HCC) N17.9, N18.9       Discharge Diagnosis: Ataxia, Visual Hallucinations, MARYANN, HTN Urgency   Discharge Disposition: Stable for Discharge   Follow up Instructions: With PCP and stewarded to neurology   Did Patient have Sepsis (YES OR NO): No    Hospital Course:    65 y.o. male with medical h/o hypertension and borderline diabetes, who presented to [facility] with \"feeling off balance\". He reports that he is also falling to the right side. The symptoms started yesterday and have persisted throughout today. His partner also notes that he has been having rambling conversations and some slurred speech. He also had the symptoms happen last week, and he went to [facility] for evaluation. He had a head CT done that was negative.   At that time, his systolic blood pressure was over 200      Hypertensive urgency: RESOLVED   - Improved blood pressure with addition of Norvasc, since MARYANN resolved marcial resume losartan + start norvasc, there is the possibility of rebound hypertension when his losartan dose was titrated down from 50 to 25 mg, the reason for this was because he was having stable and consistent lower blood pressure checks. He has been persistently hypertensive on admission, he was advised to start Norvasc in addition to losartan, but also to check blood pressures regularly and report findings to his primary care provider.     Abnormal Gait   -No ataxia, he does favor drifting to the right, unclear why this is occurred. He does have a history of back pain and sciatica he had a lumbar spine procedure in the past probably a microdissection or a laminectomy. Physical therapy and home physical therapy was arranged  - MRI with age-appropriate brain without acute or recent infarct.   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 . 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 Recommend neuro interventional  consultation.  Dr. Ya Ward recommended outpatient work-up.     Visual Hallucination  - psych evaluation ambulatory, as he desires to be discharged      Episode of SVT - Non Sustained   Resolved after baring down. - Echo Systolic function was normal. Ejection fraction was  estimated in the range of 60 % to 65 %.  There were no regional wall motion  abnormalities.     Acute renal failure superimposed on stage 3 chronic kidney disease     - Resolved Entirely may resume Losartan + Norvasc post discharge for improved BP control      Abnormal CT finding: Prior H/O COVID   Lung Infiltrate on CT neck.  No respiratory symptoms.  Patient has known Covid infection treated and end of the January.  Residual infiltrate from previous infection.  -Will monitor for any symptoms.     Type 2 diabetes mellitus without long-term current use of insulin   Patient reports he has never been started on diabetic medications but  was told that he was borderline diabetic.    - A1C at goal of 6.2      Positive serology for hepatitis C with negative RNA: Based on review of record he already has appointment with gastroenterologist as outpatient.     Discharge Exam:  Visit Vitals  BP (!) 154/81   Pulse 72   Temp 98.3 °F (36.8 °C)   Resp 16   Ht 5' 10\" (1.778 m)   Wt 63.5 kg (140 lb)   SpO2 96%   BMI 20.09 kg/m²      General: No acute distress, speaking in full sentences, no use of accessory muscles   HEENT: Pupils equal and reactive to light and accommodation, oropharynx is clear   Neck: Supple, no lymphadenopathy, no JVD   Lungs: Clear to auscultation bilaterally   Cardiovascular: Regular rate and rhythm with normal S1 and S2   Abdomen: Soft, nontender, nondistended, normoactive bowel sounds   Extremities: No cyanosis clubbing or edema   Neuro: Nonfocal, A&O x3   Psych: Normal affect     Consults: IP CONSULT TO NEUROLOGY  IP CONSULT TO PSYCHIATRY    Code Status: DNR    Significant Diagnostic Studies:   CMP:   Lab Results   Component Value Date/Time     03/25/2021 04:54 AM    K 4.8 03/25/2021 04:54 AM     (H) 03/25/2021 04:54 AM    CO2 24 03/25/2021 04:54 AM    AGAP 3 (L) 03/25/2021 04:54 AM    GLU 96 03/25/2021 04:54 AM    BUN 17 03/25/2021 04:54 AM    CREA 1.25 03/25/2021 04:54 AM    GFRAA >60 03/25/2021 04:54 AM    GFRNA 60 (L) 03/25/2021 04:54 AM    CA 8.9 03/25/2021 04:54 AM         CBC:    Lab Results   Component Value Date/Time    WBC 5.9 03/25/2021 04:54 AM    HGB 11.8 (L) 03/25/2021 04:54 AM    HCT 36.9 (L) 03/25/2021 04:54 AM     03/25/2021 04:54 AM       No results found for: INR, PTMR, PTP, PT1, PT2, INREXT    ABG:  No results found for: PH, PHI, PCO2, PCO2I, PO2, PO2I, HCO3, HCO3I, FIO2, FIO2I        No results found for: CPK, RCK1, RCK2, RCK3, RCK4, CKMB, CKNDX, CKND1, TROPT, TROIQ, BNPP, BNP        Radiology Reports :   [unfilled]      Discharge Medications:  Current Discharge Medication List      START taking these medications    Details   amLODIPine (NORVASC) 10 mg tablet Take 1 Tab by mouth daily. Qty: 30 Tab, Refills: 0         CONTINUE these medications which have NOT CHANGED    Details   losartan (COZAAR) 25 mg tablet Take 25 mg by mouth daily. magnesium 250 mg tab Take 400 mg by mouth three (3) times daily. Fenofibrate 50 mg cap Take 50 mg by mouth daily. Medications Discontinued during this hospitalization:   Medications Discontinued During This Encounter   Medication Reason    metoclopramide HCl (REGLAN) injection 10 mg     insulin regular (NOVOLIN R, HUMULIN R) injection     0.9% sodium chloride infusion     amLODIPine (NORVASC) tablet 5 mg     amLODIPine (NORVASC) tablet 10 mg     magnesium oxide (MAG-OX) tablet 400 mg        Patient Instructions: Activity: as tolerated. Diet:   DIET DIABETIC WITH OPTIONS Consistent Carb 2000kcal; Regular    Therapy Ordered:  No therapy plan of the specified type found. Follow-up appointments: Follow-up Appointments   Procedures    FOLLOW UP VISIT Appointment in: Two Weeks Primary care and directed to Neurology     Primary care and directed to Neurology     Standing Status:   Standing     Number of Occurrences:   1     Order Specific Question:   Appointment in     Answer: Two Weeks       Time Spent on Discharge greater than 30 minutes.     Tae Lezama MD  3/25/2021 11:01 AM

## 2021-03-25 NOTE — PROGRESS NOTES
Care Management Interventions  PCP Verified by CM: Yes(Jerson)  Mode of Transport at Discharge: ()  Transition of Care Consult (CM Consult): Discharge Planning  Discharge Durable Medical Equipment: No  Physical Therapy Consult: Yes  Occupational Therapy Consult: Yes  Speech Therapy Consult: No  Current Support Network: Lives with Spouse, Own Home  Confirm Follow Up Transport: Family  The Patient and/or Patient Representative was Provided with a Choice of Provider and Agrees with the Discharge Plan?: Yes  Freedom of Choice List was Provided with Basic Dialogue that Supports the Patient's Individualized Plan of Care/Goals, Treatment Preferences and Shares the Quality Data Associated with the Providers?: Yes  Discharge Location  Discharge Placement: Home with family assistance  Pt ready to d/c home with family, no further needs at this time, CM signing off

## 2021-04-01 ENCOUNTER — HOSPITAL ENCOUNTER (OUTPATIENT)
Dept: PHYSICAL THERAPY | Age: 76
Discharge: HOME | End: 2021-04-01
Payer: MEDICARE

## 2021-04-01 PROCEDURE — 97112 NEUROMUSCULAR REEDUCATION: CPT

## 2021-04-01 PROCEDURE — 97162 PT EVAL MOD COMPLEX 30 MIN: CPT

## 2021-04-01 NOTE — PROGRESS NOTES
Ora Chew  : [birth year]  [place]  Secondary: [address]  [facility], [address] [facility]  [phone]   [phone]        OUTPATIENT PHYSICAL THERAPY: Daily Treatment Note 2021  Visit Count:  1    ICD-10: Treatment Diagnosis:    Other abnormalities of gait and mobility (R26.89)   Difficulty in walking, not elsewhere classified (R26.2)   Precautions/Allergies:   Codeine   TREATMENT PLAN:  Effective Dates: 2021 TO 2021 (90 days). Frequency/Duration: 2 times a week for 90 Day(s)    Pre-treatment Symptoms/Complaints:  2021: Patient reports he would like to not be afraid that he is going to fall or stumble when he walks. Pain: Initial: Pain Intensity 1: 0  Post Session:  0/10   Medications Last Reviewed:  2021  Updated Objective Findings:  See evaluation note from today  TREATMENT:     NEUROMUSCULAR RE-EDUCATION: (15 minutes):  Exercise/activities per grid below to improve balance and coordination. Required minimal visual and verbal cues to promote static and dynamic balance in standing. Date:  2021   Activity/Exercise Parameters   Walking with head turns 4 laps  HEP   Walking with head nods 4 laps  HEP      Treatment/Session Summary:    · Response to Treatment:  Patient tolerated assessment without complaints of increased imbalance or fatigue. Patient verbalized and demonstrated understanding of HEP. · Communication/Consultation:  None today  · Equipment provided today:  None today  · Recommendations/Intent for next treatment session: Next visit will focus on improving overall mobility and strength.     Total Treatment Billable Duration:  15 minutes + evaluation  PT Patient Time In/Time Out  Time In: 930  Time Out: [address], PT    Future Appointments   Date Time Provider Sydnee Schaefer   2021  8:00 AM Mayela Mayorga, PT [facility] 4/8/2021  4:00 PM Roslyn Katz, PT [facility]   4/13/2021  4:45 PM Roslyn Katz, PT [facility]   4/15/2021  9:30 AM Lucy Alamo, PT [facility]   4/20/2021  9:30 AM Lucy Alamo, PT [facility]   4/22/2021  9:30 AM Roslyn Katz, PT [facility]   4/27/2021  9:30 AM Roslyn Katz, PT [facility]   4/28/2021 10:30 AM Leo Vu MD [facility]   4/29/2021  9:30 AM Lucy Alamo, PT [facility]

## 2021-04-01 NOTE — THERAPY EVALUATION
Mee Fernandez  :   [place]  Secondary: [address] [place] [address] at [facility], [address] [facility],[address], [address] [facility]  [phone]   [phone]          OUTPATIENT PHYSICAL THERAPY:Initial Assessment 2021   ICD-10: Treatment Diagnosis:    Other abnormalities of gait and mobility (R26.89)   Difficulty in walking, not elsewhere classified (R26.2)   Precautions/Allergies:   Codeine   TREATMENT PLAN:  Effective Dates: 2021 TO 2021 (90 days). Frequency/Duration: 2 times a week for 90 Day(s) MEDICAL/REFERRING DIAGNOSIS:  Cerebral infarction, unspecified [I63.9]   DATE OF ONSET: 2021  REFERRING PHYSICIAN: Carson Velasquez MD MD Orders: Evaluate and Treat  Return MD Appointment: TBD     INITIAL ASSESSMENT:  Mr. Jerry Link presents with decreased mobility, decreased strength, decreased endurance, and decreased vestibular habituation with ambulation secondary to degenerative changes. After discussing with patient, he agreed he would benefit from physical therapy to improve above deficits. Please sign this plan of treatment if you concur. Thank you for the opportunity to serve this patient. PROBLEM LIST (Impacting functional limitations):  1. Decreased Strength  2. Decreased ADL/Functional Activities  3. Decreased Ambulation Ability/Technique  4. Decreased Balance  5. Decreased Activity Tolerance INTERVENTIONS PLANNED: (Treatment may consist of any combination of the following)  1. Balance Exercise  2. Gait Training  3. Home Exercise Program (HEP)  4. Neuromuscular Re-education/Strengthening  5. Therapeutic Activites  6. Therapeutic Exercise/Strengthening     GOALS: (Goals have been discussed and agreed upon with patient.)  Short-Term Functional Goals: Time Frame: 30 days  1. Patient will be independent with home exercise program without exacerbation of symptoms or cueing needed.   Discharge Goals: Time Frame: 90 days  1. Patient will be independent with all ADLs with minimal fear of falling and loss of balance with daily tasks. 2. Patient will report no fear avoidance with social or recreational activities due to fear of falling. 3. Patient will score greater than or equal to 50/56 on Cummings Balance Scale with minimal effect of dizziness or imbalance on patient's ability to manage every day life activities. 4. Patient will score greater than or equal to 20/24 on Dynamic Gait Index with minimal effect of dizziness or imbalance on patient's ability to manage every day life activities. OUTCOME MEASURE:   Tool Used: Cummings Balance Scale  Score:  Initial: 42/56 Most Recent: X/56 (Date: -- )   Interpretation of Score: Each section is scored on a 0-4 scale, 0 representing the patients inability to perform the task and 4 representing independence. The scores of each section are added together for a total score of 56. The higher the patients score, the more independent the patient is. Any score below 45 indicates increased risk for falls. Tool Used: Dynamic Gait Index  Score:  Initial: 18/24 Most Recent: X/24 (Date: -- )   Interpretation of Score: Each section is scored on a 0-3 scale, 0 representing the patients inability to perform the task and 3 representing independence. The scores of each section are added together for a total score of 24. Any score below 19 indicates increased risk for falls. MEDICAL NECESSITY:   · Patient is expected to demonstrate progress in strength, range of motion, balance and coordination to improve safety during daily activities. · Patient demonstrates good rehab potential due to higher previous functional level. · Skilled intervention continues to be required due to decreased functional mobility.   REASON FOR SERVICES/OTHER COMMENTS:  · Patient continues to demonstrate capacity to improve overall functional mobility which will increase independence and increase safety. Total Duration:  PT Patient Time In/Time Out  Time In: 0930  Time Out: 1015    Rehabilitation Potential For Stated Goals: Good  Regarding Adam Maciel's therapy, I certify that the treatment plan above will be carried out by a therapist or under their direction. Thank you for this referral,  Germain Martinez, PT     Referring Physician Signature: Jemma Sapp MD _______________________________ Date _____________      PAIN/SUBJECTIVE:   Initial: Pain Intensity 1: 0  Post Session:  0/10   HISTORY:   History of Injury/Illness (Reason for Referral):  Patient reports he has been feeling off balance and dizzy for several weeks now. He reports he was in the hospital with elevated blood pressure. He reports his blood pressure is under better control now, but he is still dizzy and tends to veer to the right when he walks. 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 He reports that he can hear a little out of his right ear with the hearing aide, but can not hear at all out of his left ear. He reports he would like to work on improving his balance and dizziness so he is not afraid of falling. Past Medical History/Comorbidities:   Mr. Ashley Prabhakar  has a past medical history of CAD (coronary artery disease), Cancer ([address] Hypertension, and Ill-defined condition. Mr. Ashley Prabhakar  has no past surgical history on file. Social History/Living Environment:   Home Environment: Private residence  Living Alone: No  Support Systems: Family member(s), Friends \ neighbors  Prior Level of Function/Work/Activity:  Independent  Dominant Side:         RIGHT  Personal Factors:          Sex:  male        Age:  76 y.o.    Ambulatory/Rehab Services H2 Model Falls Risk Assessment   Risk Factors:       (1)  Dizziness/Vertigo       (1)  Gender [Male] Ability to Rise from Chair:       (1)  Pushes up, successful in one attempt   Falls Prevention Plan:       No modifications necessary   Total: (5 or greater = High Risk): 3   ©2010 [employer] [address] [facility] Patent #[id number]. Federal Law prohibits the replication, distribution or use without written permission from [facility] [employer] [facility]   Current Medications:       Current Outpatient Medications:     amLODIPine (NORVASC) 10 mg tablet, Take 1 Tab by mouth daily. , Disp: 30 Tab, Rfl: 0    losartan (COZAAR) 25 mg tablet, Take 25 mg by mouth daily. , Disp: , Rfl:     magnesium 250 mg tab, Take 400 mg by mouth three (3) times daily. , Disp: , Rfl:     Fenofibrate 50 mg cap, Take 50 mg by mouth daily. , Disp: , Rfl:    Date Last Reviewed:  4/1/2021    Number of Personal Factors/Comorbidities that affect the Plan of Care: 1-2: MODERATE COMPLEXITY   EXAMINATION:   Observation/Orthostatic Postural Assessment:          Posture Assessment: Rounded shoulders, Forward head   Functional Mobility:   Gait/Mobility:    Base of Support: Center of gravity altered  Speed/Debby: Pace decreased (<100 feet/min)  Step Length: Left shortened, Right shortened  Swing Pattern: Left asymmetrical, Right asymmetrical  Stance: Left decreased, Right decreased  Gait Abnormalities: Antalgic  Ambulation - Level of Assistance: Independent  · Interventions: Verbal cues, Safety awareness training          Transfers:     Sit to Stand: Independent  Stand to Sit: Independent  Stand Pivot Transfers: Independent  Bed to Chair: Independent  Lateral Transfers: Independent         Bed Mobility:     Rolling: Independent  Supine to Sit: Independent  Sit to Supine: Independent  Scooting: Independent       Strength:          UE STRENGTH: 4/5 shoulder flexion, 4/5 shoulder abduction, 4/5 shoulder extension, 4/5 elbow flexion, 4/5 elbow extension. LE STRENGTH:  4/5 hip flexion, 4/5 hip abduction, 4/5 hip adduction, 4/5 hip extension, 4/5 knee extension, 4/5 knee flexion, 3/5 ankle dorsiflexion, 3/5 ankle plantarflexion, 3/5 ankle inversion, 3/5 ankle eversion.   Sensation:         Intact  Postural Control & Balance:  · Cummings Balance Scale:  42/56.   (A score less than 45/56 indicates high risk of falls)     · Dynamic Gait Index:  18/24.   (A score less than or equal to19/24 is abnormal and predictive of falls)        Body Structures Involved:  1. Nerves  2. Muscles Body Functions Affected:  1. Neuromusculoskeletal  2. Movement Related Activities and Participation Affected:  1. Mobility  2.  Self Care   Number of elements (examined above) that affect the Plan of Care: 4+: HIGH COMPLEXITY   CLINICAL PRESENTATION:   Presentation: Evolving clinical presentation with changing clinical characteristics: MODERATE COMPLEXITY   CLINICAL DECISION MAKING:   Use of outcome tool(s) and clinical judgement create a POC that gives a: Questionable prediction of patient's progress: MODERATE COMPLEXITY

## 2021-04-06 ENCOUNTER — HOSPITAL ENCOUNTER (OUTPATIENT)
Dept: PHYSICAL THERAPY | Age: 76
Discharge: HOME | End: 2021-04-06
Payer: MEDICARE

## 2021-04-06 PROCEDURE — 97112 NEUROMUSCULAR REEDUCATION: CPT

## 2021-04-06 NOTE — PROGRESS NOTES
Yeimy Calles  :   [place]  Secondary: [address]  [address] [facility], [address] [place] [address],[address], [facility] [address]  [phone]   [phone]        OUTPATIENT PHYSICAL THERAPY: Daily Treatment Note 2021  Visit Count:  2    ICD-10: Treatment Diagnosis:    Other abnormalities of gait and mobility (R26.89)   Difficulty in walking, not elsewhere classified (R26.2)   Precautions/Allergies:   Codeine   TREATMENT PLAN:  Effective Dates: 2021 TO 2021 (90 days). Frequency/Duration: 2 times a week for 90 Day(s)    Pre-treatment Symptoms/Complaints:  2021: Patient reports his dizziness is better today. Pain: Initial: Pain Intensity 1: 0  Post Session:  0/10   Medications Last Reviewed:  2021  Updated Objective Findings:  None Today  TREATMENT:     NEUROMUSCULAR RE-EDUCATION: (45 minutes):  Exercise/activities per grid below to improve balance and coordination. Required minimal visual and verbal cues to promote static and dynamic balance in standing. Date:  2021   Activity/Exercise Parameters   Walking with head turns 4 laps   Walking with head nods 4 laps   Marching in hallway 4 laps   Walking backward 4 laps   Circles around cones 3 reps  2 sets   Figure 8s around cones 3 reps  2 sets   Step taps 6 inch  15 reps forward  15 reps cross   Tiltboard: right/left Static and dynamic   Tiltboard: forward/backward Static and dynamic   [place] Static and dynamic   Blue foams Staggered stance - dynamic      Treatment/Session Summary:    · Response to Treatment:  Patient tolerated treatment well with improving overall balance and vestibular habituation. · Communication/Consultation:  None today  · Equipment provided today:  None today  · Recommendations/Intent for next treatment session: Next visit will focus on improving overall mobility and strength.     Total Treatment Billable Duration:  45 minutes  PT Patient Time In/Time Out  Time In: 0800  Time Out: [address], PT    Future Appointments   Date Time Provider Sydnee Schaefer   4/6/2021  8:00 AM Elverna Elianeu, PT [facility]   4/8/2021  4:00 PM Elverna Elianeu, PT [facility]   4/13/2021  4:45 PM Elvernbindu Del Riou, PT [facility]   4/15/2021  9:30 AM VisgeAarti, PT [facility]   4/20/2021  9:30 AM Aarti Alamo, PT [facility]   4/22/2021  9:30 AM Elvernbindu Leal, PT [facility]   4/27/2021  9:30 AM Elverna Mel, PT [facility]   4/28/2021 10:30 AM Mireya Yoo MD [facility]   4/29/2021  9:30 AM Aarti Alamo, PT [facility]

## 2021-04-08 ENCOUNTER — HOSPITAL ENCOUNTER (OUTPATIENT)
Dept: PHYSICAL THERAPY | Age: 76
Discharge: HOME | End: 2021-04-08
Payer: MEDICARE

## 2021-04-08 PROCEDURE — 97112 NEUROMUSCULAR REEDUCATION: CPT

## 2021-04-08 NOTE — PROGRESS NOTES
Barry Vance  :   [place]  Secondary: [place] [address] [address] at [address], [address] [place] [address],[address], [place] [address].  [phone]   [phone]        OUTPATIENT PHYSICAL THERAPY: Daily Treatment Note 2021  Visit Count:  3    ICD-10: Treatment Diagnosis:    Other abnormalities of gait and mobility (R26.89)   Difficulty in walking, not elsewhere classified (R26.2)   Precautions/Allergies:   Codeine   TREATMENT PLAN:  Effective Dates: 2021 TO 2021 (90 days). Frequency/Duration: 2 times a week for 90 Day(s)    Pre-treatment Symptoms/Complaints:  2021: Patient reports he is feeling good right now. Pain: Initial: Pain Intensity 1: 0  Post Session:  0/10   Medications Last Reviewed:  2021  Updated Objective Findings:  None Today  TREATMENT:     NEUROMUSCULAR RE-EDUCATION: (45 minutes):  Exercise/activities per grid below to improve balance and coordination. Required minimal visual and verbal cues to promote static and dynamic balance in standing. Date:  2021   Activity/Exercise Parameters   Walking with head turns 4 laps   Walking with head nods 4 laps   Marching in hallway 4 laps   Walking backward 4 laps   Circles around cones 3 reps  2 sets   Figure 8s around cones 3 reps  2 sets   Step taps 6 inch  15 reps forward  15 reps cross   Tiltboard: right/left Static and dynamic   Tiltboard: forward/backward Static and dynamic   Sanddune Static and dynamic   Blue foams Staggered stance - dynamic      Treatment/Session Summary:    · Response to Treatment:  Patient tolerated treatment well with improving overall balance and vestibular habituation. · Communication/Consultation:  None today  · Equipment provided today:  None today  · Recommendations/Intent for next treatment session: Next visit will focus on improving overall mobility and strength.     Total Treatment Billable Duration:  45 minutes  PT Patient Time In/Time Out  Time In: 1600  Time Out: [address], PT    Future Appointments   Date Time Provider Sydnee Schaefer   4/8/2021  4:00 PM Genna Amel, PT [facility] SFE   4/13/2021  4:45 PM Genna Amel, PT [facility] SFE   4/15/2021  9:30 AM VisJoel pruett [place], PT SFEORPT SFE   4/20/2021  9:30 AM Joel Alamo, PT [facility] SFE   4/22/2021  9:30 AM Genna Amel, PT [facility] SFE   4/27/2021  9:30 AM Genna Amel, PT [facility] SFE   4/28/2021 10:30 AM Lynette Gonzalez MD [facility] BSNEV   4/29/2021  9:30 AM Joel Alamo, PT SFE[facility]PT SFE

## 2021-04-13 ENCOUNTER — HOSPITAL ENCOUNTER (OUTPATIENT)
Dept: PHYSICAL THERAPY | Age: 76
Discharge: HOME | End: 2021-04-13
Payer: MEDICARE

## 2021-04-13 PROCEDURE — 97112 NEUROMUSCULAR REEDUCATION: CPT

## 2021-04-13 NOTE — PROGRESS NOTES
Reginold Angelucci  : [id number]  [place]  Secondary: [place] [id number] [facility], [address] [place] [address],[address], [facility] U [address].  [phone]   [phone]        OUTPATIENT PHYSICAL THERAPY: Daily Treatment Note 2021  Visit Count:  4    ICD-10: Treatment Diagnosis:    Other abnormalities of gait and mobility (R26.89)   Difficulty in walking, not elsewhere classified (R26.2)   Precautions/Allergies:   Codeine   TREATMENT PLAN:  Effective Dates: 2021 TO 2021 (90 days). Frequency/Duration: 2 times a week for 90 Day(s)    Pre-treatment Symptoms/Complaints:  2021: Patient reports he is feeling good today, but is going to have a heart cath soon. Pain: Initial: Pain Intensity 1: 0  Post Session:  0/10   Medications Last Reviewed:  2021  Updated Objective Findings:  None Today  TREATMENT:     NEUROMUSCULAR RE-EDUCATION: (45 minutes):  Exercise/activities per grid below to improve balance and coordination. Required minimal visual and verbal cues to promote static and dynamic balance in standing. Date:  2021   Activity/Exercise Parameters   Walking with head turns 4 laps   Walking with head nods 4 laps   Marching in hallway 4 laps   Walking backward 4 laps   Circles around cones 3 reps  2 sets   Figure 8s around cones 3 reps  2 sets   Step taps 6 inch  15 reps forward  15 reps cross   Tiltboard: right/left Static and dynamic   Tiltboard: forward/backward Static and dynamic   Sanddune Static and dynamic   Blue foams Staggered stance - dynamic      Treatment/Session Summary:    · Response to Treatment:  Patient tolerated treatment well with improving overall balance and vestibular habituation. · Communication/Consultation:  None today  · Equipment provided today:  None today  · Recommendations/Intent for next treatment session: Next visit will focus on improving overall mobility and strength.     Total Treatment Billable Duration:  45 minutes  PT Patient Time In/Time Out  Time In: 9195  Time Out: Hugo [address], PT    Future Appointments   Date Time Provider Sydnee Schaefer   4/13/2021  4:45 PM Matt Turcios, PT [facility] SFE   4/15/2021  9:30 AM Fernando Alamo, PT SFEORPT SFE   4/20/2021  9:30 AM Fernando Alamo, PT [facility]PT SFE   4/22/2021  9:30 AM Matt Turcios, PT [facility]PT SFE   4/27/2021  9:30 AM Matt Turcios, PT SFE[facility]PT SFE   4/28/2021 10:30 AM Bravo Johnson MD [facility] BSNEV   4/29/2021  9:30 AM Fernando Alamo, PT SFEORPT SFE

## 2021-04-15 ENCOUNTER — HOSPITAL ENCOUNTER (OUTPATIENT)
Dept: PHYSICAL THERAPY | Age: 76
Discharge: HOME | End: 2021-04-15
Payer: MEDICARE

## 2021-04-15 PROCEDURE — 97112 NEUROMUSCULAR REEDUCATION: CPT

## 2021-04-15 NOTE — PROGRESS NOTES
Madonna Morris  :   [place]  Secondary: [address]  27[address] [facility], [address] [place] [address],[address], [facility] U[address].  [phone]   [phone]        OUTPATIENT PHYSICAL THERAPY: Daily Treatment Note 4/15/2021  Visit Count:  5    ICD-10: Treatment Diagnosis:    Other abnormalities of gait and mobility (R26.89)   Difficulty in walking, not elsewhere classified (R26.2)   Precautions/Allergies:   Codeine   TREATMENT PLAN:  Effective Dates: 2021 TO 2021 (90 days). Frequency/Duration: 2 times a week for 90 Day(s)    Pre-treatment Symptoms/Complaints:  4/15/2021: Patient reports having a headache. \"I had one episode of dizziness last night. No dizziness today. Heart cath next Thursday\". Pain: Initial: Pain Intensity 1: 2  Pain Location 1: Head  Post Session:  2/10   Medications Last Reviewed:  4/15/2021  Updated Objective Findings:  None Today  TREATMENT:     NEUROMUSCULAR RE-EDUCATION: (45 minutes):  Exercise/activities per grid below to improve balance and coordination. Required minimal visual and verbal cues to promote static and dynamic balance in standing. Date:  4/15/2021   Activity/Exercise Parameters   Walking with head turns 4 laps   Walking with head nods 4 laps   Marching in hallway 4 laps   Walking backward 4 laps   Circles around cones 3 reps  2 sets   Figure 8s around cones 3 reps  2 sets   Step taps 6 inch  15 reps forward  15 reps cross   Tiltboard: right/left Static and dynamic   Tiltboard: forward/backward Static and dynamic   Sanddune Static and dynamic   Blue foams Staggered stance - dynamic      Treatment/Session Summary:    · Response to Treatment:  Patient tolerated exercises without complaints.     · Communication/Consultation:  None today  · Equipment provided today:  None today  · Recommendations/Intent for next treatment session: Next visit will focus on improving overall mobility and strength.     Total Treatment Billable Duration:  45 minutes  PT Patient Time In/Time Out  Time In: 0930  Time Out: Frida Mondragon, PT    Future Appointments   Date Time Provider Sydnee Schaefer   4/20/2021  9:30 AM Satinder Bautista, EMILIANO [facility] SFEMILE   4/22/2021  7:00 PM Leslie Gregory, PT [facility] SFE   4/27/2021  9:30 AM Leslie Gregory, PT [facility] SFE   4/28/2021 10:30 AM Silvia Tinajero MD [facility] BSNEV   4/29/2021  9:30 AM Cielo Alamo, EMILIANO [facility]

## 2021-04-20 ENCOUNTER — HOSPITAL ENCOUNTER (OUTPATIENT)
Dept: PHYSICAL THERAPY | Age: 76
Discharge: HOME | End: 2021-04-20
Payer: MEDICARE

## 2021-04-20 PROCEDURE — 97112 NEUROMUSCULAR REEDUCATION: CPT

## 2021-04-20 NOTE — PROGRESS NOTES
Stephanie Ferguson  : [id number]  [place]  Secondary: [address]  2700 [facility], [address] [place] [address] 83,[address] 987, 1771 [facility]  [phone]   [phone]        OUTPATIENT PHYSICAL THERAPY: Daily Treatment Note 2021  Visit Count:  6    ICD-10: Treatment Diagnosis:    Other abnormalities of gait and mobility (R26.89)   Difficulty in walking, not elsewhere classified (R26.2)   Precautions/Allergies:   Codeine   TREATMENT PLAN:  Effective Dates: 2021 TO 2021 (90 days). Frequency/Duration: 2 times a week for 90 Day(s)    Pre-treatment Symptoms/Complaints:  2021: \"No dizziness, I have a small headache\". Pain: Initial: Pain Intensity 1: 2  Pain Location 1: Head  Post Session:  2/10   Medications Last Reviewed:  2021  Updated Objective Findings:  None Today  TREATMENT:     NEUROMUSCULAR RE-EDUCATION: (45 minutes):  Exercise/activities per grid below to improve balance and coordination. Required minimal visual and verbal cues to promote static and dynamic balance in standing. Date:  2021   Activity/Exercise Parameters   Walking with head turns 4 laps   Walking with head nods 4 laps   Marching in hallway 4 laps   Walking backward 4 laps   Circles around cones 3 reps  2 sets   Figure 8s around cones 3 reps  2 sets   Step taps 6 inch  15 reps forward  15 reps cross   Tiltboard: right/left Static and dynamic   Tiltboard: forward/backward Static and dynamic   Sanddune Static and dynamic   Blue foams Staggered stance - dynamic   Walking with diagonal head turns 4 laps   Single leg stance Eyes open              Treatment/Session Summary:    · Response to Treatment:  Patient reports no dizziness during treatment. Patient progressing well.     · Communication/Consultation:  None today  · Equipment provided today:  None today  · Recommendations/Intent for next treatment session: Next visit will focus on improving overall mobility and strength.     Total Treatment Billable Duration:  45 minutes  PT Patient Time In/Time Out  Time In: 0930  Time Out: Frida Mondragon, PT    Future Appointments   Date Time Provider Sydnee Schaefer   4/22/2021  7:00 PM Roslyn Katz, [facility] SFE   4/27/2021  9:30 AM Roslyn Katz, PT [facility] SFE   4/28/2021 10:30 AM Leo Vu MD [facility] BSNEV   4/29/2021  9:30 AM Lucy Alamo, PT [facility]

## 2021-04-22 ENCOUNTER — HOSPITAL ENCOUNTER (OUTPATIENT)
Dept: PHYSICAL THERAPY | Age: 76
Discharge: HOME | End: 2021-04-22
Payer: MEDICARE

## 2021-04-22 NOTE — PROGRESS NOTES
Lillie Salmon  :   [place]  Secondary: [address] 247 [facility]  [address], [address], [place] [address].  [phone]   [phone]          OUTPATIENT DAILY NOTE    NAME/AGE/GENDER: Lillie Salmon is a 76 y.o. male. DATE: 2021    Patient cancelled (more than 24 hours ago) and rescheduled his appointment for today due to reasons not given. Will plan to follow up on next scheduled visit.     Calin Haney, PT    Future Appointments   Date Time Provider Sydnee Schaefer   2021  7:00 PM Jacinto Downing [facility] SFE   2021  9:30 AM Jared Grande PT [facility] SFE   2021 10:30 AM Zo Rajput MD [facility] BSNEV   2021  9:30 AM Tae Alamo, PT SFEORPT SFE

## 2021-04-27 ENCOUNTER — HOSPITAL ENCOUNTER (OUTPATIENT)
Dept: PHYSICAL THERAPY | Age: 76
Discharge: HOME | End: 2021-04-27
Payer: MEDICARE

## 2021-04-27 PROCEDURE — 97112 NEUROMUSCULAR REEDUCATION: CPT

## 2021-04-27 NOTE — PROGRESS NOTES
Madonna Morris  :   [place]  Secondary: [address] at [facility]  2700 [facility], [address] [place] [address],[address], [facility] [address].  [phone]   [phone]          OUTPATIENT PHYSICAL THERAPY:Progress Report 2021   ICD-10: Treatment Diagnosis:    Other abnormalities of gait and mobility (R26.89)   Difficulty in walking, not elsewhere classified (R26.2)   Precautions/Allergies:   Codeine   TREATMENT PLAN:  Effective Dates: 2021 TO 2021 (90 days). Frequency/Duration: 2 times a week for 90 Day(s) MEDICAL/REFERRING DIAGNOSIS:  Cerebral infarction, unspecified [I63.9]   DATE OF ONSET: 2021  REFERRING PHYSICIAN: Linda Lunsford MD MD Orders: Evaluate and Treat  Return MD Appointment: TBD     ASSESSMENT:  Mr. Makeda Tejeda presents with improving mobility, strength, endurance, and vestibular habituation with daily activities. Patient has attended a total of 7 scheduled physical therapy visits including initial evaluation on 2021. Treatment has consisted of balance and vestibular training to improve overall safety, mobility, and performance with activities of daily living. Patient is making excellent progress with all aspects of PT. PROBLEM LIST (Impacting functional limitations):  1. Decreased Strength  2. Decreased ADL/Functional Activities  3. Decreased Ambulation Ability/Technique  4. Decreased Balance  5. Decreased Activity Tolerance INTERVENTIONS PLANNED: (Treatment may consist of any combination of the following)  1. Balance Exercise  2. Gait Training  3. Home Exercise Program (HEP)  4. Neuromuscular Re-education/Strengthening  5. Therapeutic Activites  6. Therapeutic Exercise/Strengthening     GOALS: (Goals have been discussed and agreed upon with patient.)  Short-Term Functional Goals: Time Frame: 30 days  1.  Patient will be independent with home exercise program without exacerbation of symptoms or cueing needed--goal met. Discharge Goals: Time Frame: 90 days  1. Patient will be independent with all ADLs with minimal fear of falling and loss of balance with daily tasks--goal ongoing. 2. Patient will report no fear avoidance with social or recreational activities due to fear of falling--goal ongoing. 3. Patient will score greater than or equal to 50/56 on Cummings Balance Scale with minimal effect of dizziness or imbalance on patient's ability to manage every day life activities--goal met. 4. Patient will score greater than or equal to 20/24 on Dynamic Gait Index with minimal effect of dizziness or imbalance on patient's ability to manage every day life activities--goal met. OUTCOME MEASURE:   Tool Used: Cummings Balance Scale  Score:  Initial: 42/56 Most Recent: 50/56 (Date: 4/27/2021 )   Interpretation of Score: Each section is scored on a 0-4 scale, 0 representing the patients inability to perform the task and 4 representing independence. The scores of each section are added together for a total score of 56. The higher the patients score, the more independent the patient is. Any score below 45 indicates increased risk for falls. Tool Used: Dynamic Gait Index  Score:  Initial: 18/24 Most Recent: 20/24 (Date: 4/27/2021 )   Interpretation of Score: Each section is scored on a 0-3 scale, 0 representing the patients inability to perform the task and 3 representing independence. The scores of each section are added together for a total score of 24. Any score below 19 indicates increased risk for falls. MEDICAL NECESSITY:   · Patient is expected to demonstrate progress in strength, range of motion, balance and coordination to improve safety during daily activities. · Patient demonstrates good rehab potential due to higher previous functional level. · Skilled intervention continues to be required due to decreased functional mobility.   REASON FOR SERVICES/OTHER COMMENTS:  · Patient continues to demonstrate capacity to improve overall functional mobility which will increase independence and increase safety. Total Duration:  PT Patient Time In/Time Out  Time In: 0930  Time Out: 1015    Rehabilitation Potential For Stated Goals: Good     PAIN/SUBJECTIVE:   Initial: Pain Intensity 1: 0  Post Session:  0/10   HISTORY:   History of Injury/Illness (Reason for Referral):  Patient reports he has been feeling off balance and dizzy for several weeks now. He reports he was in the hospital with elevated blood pressure. He reports his blood pressure is under better control now, but he is still dizzy and tends to veer to the right when he walks. 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 He reports that he can hear a little out of his right ear with the hearing aide, but can not hear at all out of his left ear. He reports he would like to work on improving his balance and dizziness so he is not afraid of falling.    4/27/2021 (Progress Note): Patient reports his dizziness is much better, but he is still having headaches. Past Medical History/Comorbidities:   Mr. Natalie Resendiz  has a past medical history of CAD (coronary artery disease), Cancer ([facility] [address] Hypertension, and Ill-defined condition. Mr. Natalie Resendiz  has no past surgical history on file. Social History/Living Environment:   Home Environment: Private residence  Living Alone: No  Support Systems: Family member(s), Friends \ neighbors  Prior Level of Function/Work/Activity:  Independent  Dominant Side:         RIGHT  Personal Factors:          Sex:  male        Age:  76 y.o. Current Medications:       Current Outpatient Medications:     amLODIPine (NORVASC) 10 mg tablet, Take 1 Tab by mouth daily. , Disp: 30 Tab, Rfl: 0    losartan (COZAAR) 25 mg tablet, Take 25 mg by mouth daily. , Disp: , Rfl:     magnesium 250 mg tab, Take 400 mg by mouth three (3) times daily. , Disp: , Rfl:     Fenofibrate 50 mg cap, Take 50 mg by mouth daily. , Disp: , Rfl:    Date Last Reviewed:  4/27/2021    EXAMINATION:   Observation/Orthostatic Postural Assessment:          Posture Assessment: Rounded shoulders, Forward head   Functional Mobility:   Gait/Mobility:    Base of Support: Center of gravity altered  Speed/Debby: Pace decreased (<100 feet/min)  Step Length: Left shortened, Right shortened  Swing Pattern: Left asymmetrical, Right asymmetrical  Stance: Left decreased, Right decreased  Gait Abnormalities: Antalgic  Ambulation - Level of Assistance: Independent  · Interventions: Verbal cues, Safety awareness training          Transfers:     Sit to Stand: Independent  Stand to Sit: Independent  Stand Pivot Transfers: Independent  Bed to Chair: Independent  Lateral Transfers: Independent         Bed Mobility:     Rolling: Independent  Supine to Sit: Independent  Sit to Supine: Independent  Scooting: Independent       Strength:          UE STRENGTH: 4/5 shoulder flexion, 4/5 shoulder abduction, 4/5 shoulder extension, 4/5 elbow flexion, 4/5 elbow extension. LE STRENGTH:  4/5 hip flexion, 4/5 hip abduction, 4/5 hip adduction, 4/5 hip extension, 4/5 knee extension, 4/5 knee flexion, 3/5 ankle dorsiflexion, 3/5 ankle plantarflexion, 3/5 ankle inversion, 3/5 ankle eversion.   Sensation:         Intact

## 2021-04-27 NOTE — PROGRESS NOTES
Nimo Jojo  :   [place]  Secondary: [place] [address] [facility] at [address] [facility]  2700 [facility], [address] [place] [address],[address], 2983 [facility]  [phone]   [phone]        OUTPATIENT PHYSICAL THERAPY: Daily Treatment Note 2021  Visit Count:  7    ICD-10: Treatment Diagnosis:    Other abnormalities of gait and mobility (R26.89)   Difficulty in walking, not elsewhere classified (R26.2)   Precautions/Allergies:   Codeine   TREATMENT PLAN:  Effective Dates: 2021 TO 2021 (90 days). Frequency/Duration: 2 times a week for 90 Day(s)    Pre-treatment Symptoms/Complaints:  2021: Patient reports his dizziness is much better, but he is still having headaches. Pain: Initial: Pain Intensity 1: 0  Post Session:  2/10   Medications Last Reviewed:  2021  Updated Objective Findings:  None Today  TREATMENT:     NEUROMUSCULAR RE-EDUCATION: (45 minutes):  Exercise/activities per grid below to improve balance and coordination. Required minimal visual and verbal cues to promote static and dynamic balance in standing. Date:  2021   Activity/Exercise Parameters   Walking with head turns 4 laps   Walking with head nods 4 laps   Marching in hallway 4 laps   Walking backward 4 laps   Circles around cones 3 reps  2 sets   Figure 8s around cones 3 reps  2 sets   Step taps 6 inch  15 reps forward  15 reps cross   Tiltboard: right/left Static and dynamic   Tiltboard: forward/backward Static and dynamic   Sanddune Static and dynamic   Blue foams Staggered stance - dynamic   Walking with diagonal head turns 4 laps   Single leg stance Eyes open              Treatment/Session Summary:    · Response to Treatment:  Patient tolerated treatment well with improving overall mobility and balance.   · Communication/Consultation:  None today  · Equipment provided today:  None today  · Recommendations/Intent for next treatment session: Next visit will focus on improving overall mobility and strength.     Total Treatment Billable Duration:  45 minutes  PT Patient Time In/Time Out  Time In: 0930  Time Out: [address], PT    Future Appointments   Date Time Provider Sydnee Schaefer   4/28/2021 10:30 AM Mariann Wilcox MD [facility] BSNEV   4/29/2021  9:30 AM Joey Alamo, EMILIANO SFEORPT SFE

## 2021-04-29 ENCOUNTER — HOSPITAL ENCOUNTER (OUTPATIENT)
Dept: PHYSICAL THERAPY | Age: 76
Discharge: HOME | End: 2021-04-29
Payer: MEDICARE

## 2021-04-29 PROCEDURE — 97112 NEUROMUSCULAR REEDUCATION: CPT

## 2021-04-29 NOTE — PROGRESS NOTES
Ilsa Zabala  :   [place]  Secondary: [address]  2700 [facility], [address] [place] [address] 83,[address] 646, 5687 [facility]  [phone]   [phone]        OUTPATIENT PHYSICAL THERAPY: Daily Treatment Note 2021  Visit Count:  8    ICD-10: Treatment Diagnosis:     Other abnormalities of gait and mobility (R26.89)   Difficulty in walking, not elsewhere classified (R26.2)   Precautions/Allergies:   Codeine   TREATMENT PLAN:  Effective Dates: 2021 TO 2021 (90 days). Frequency/Duration: 2 times a week for 90 Day(s)    Pre-treatment Symptoms/Complaints:  2021: Patient reports he is feeling good today. \"I did take an Advil this morning\". Pain: Initial: Pain Intensity 1: 0  Post Session:  0/10   Medications Last Reviewed:  2021  Updated Objective Findings:  None Today  TREATMENT:     NEUROMUSCULAR RE-EDUCATION: (45 minutes):  Exercise/activities per grid below to improve balance and coordination. Required minimal visual and verbal cues to promote static and dynamic balance in standing. Date:  2021   Activity/Exercise Parameters   Walking with head turns 4 laps   Walking with head nods 4 laps   Marching in hallway 4 laps   Walking backward 4 laps   Circles around cones 3 reps  2 sets   Figure 8s around cones 3 reps  2 sets   Step taps 6 inch  15 reps forward  15 reps cross   Tiltboard: right/left Static and dynamic   Tiltboard: forward/backward Static and dynamic   Sanddune Static and dynamic   Blue foams Staggered stance - dynamic   Walking with diagonal head turns 4 laps   Single leg stance Eyes open              Treatment/Session Summary:    · Response to Treatment:  Patient tolerated exercises without complaints. Patient needed less rest breaks during treatment today.    · Communication/Consultation:  None today  · Equipment provided today:  None today  · Recommendations/Intent for next treatment session: Next visit will focus on improving overall mobility and strength.     Total Treatment Billable Duration:  40 minutes  PT Patient Time In/Time Out  Time In: 0930  Time Out: [address] [place] Germania Abdi, PT    Future Appointments   Date Time Provider Sydnee Schaefer   5/4/2021  8:45 AM Hunter Fitzgerald, PT [facility] SFE   5/7/2021  9:30 AM Vissage, Artelia Angela, PT SFEORPT SFE   5/13/2021  1:00 PM Vissage, Artelia Angela, PT SFEORPT SFE   5/18/2021  9:30 AM Vissage, Artelia Angela, PT SFEORPT SFE   5/21/2021  9:30 AM Vissage, Artelia Angela, PT SFEORPT SFE   5/25/2021  9:30 AM Vissage, Artelia Angela, PT SFEORPT SFE   5/28/2021  9:30 AM Vissage, Artelia Angela, PT SFEORPT SFE

## 2021-05-04 ENCOUNTER — HOSPITAL ENCOUNTER (OUTPATIENT)
Dept: PHYSICAL THERAPY | Age: 76
Discharge: HOME | End: 2021-05-04
Payer: MEDICARE

## 2021-05-04 PROCEDURE — 97112 NEUROMUSCULAR REEDUCATION: CPT

## 2021-05-04 NOTE — PROGRESS NOTES
Unique Friday  :   [place]  Secondary: [place] [id number] [facility], [address] [place] [address],[address], [facility] [address].  [phone]   [phone]        OUTPATIENT PHYSICAL THERAPY: Daily Treatment Note 2021  Visit Count:  9    ICD-10: Treatment Diagnosis:     Other abnormalities of gait and mobility (R26.89)   Difficulty in walking, not elsewhere classified (R26.2)   Precautions/Allergies:   Codeine   TREATMENT PLAN:  Effective Dates: 2021 TO 2021 (90 days). Frequency/Duration: 2 times a week for 90 Day(s)    Pre-treatment Symptoms/Complaints:  2021: Patient reports no dizziness and no headache. Pain: Initial: Pain Intensity 1: 0  Post Session:  0/10   Medications Last Reviewed:  2021  Updated Objective Findings:  None Today  TREATMENT:     NEUROMUSCULAR RE-EDUCATION: (40 minutes):  Exercise/activities per grid below to improve balance and coordination. Required minimal visual and verbal cues to promote static and dynamic balance in standing. Date:  2021   Activity/Exercise Parameters   Walking with head turns 4 laps   Walking with head nods 4 laps   Marching in hallway 4 laps   Walking backward 4 laps   Circles around cones 3 reps  2 sets   Figure 8s around cones 3 reps  2 sets   Step taps 6 inch  15 reps forward  15 reps cross   Tiltboard: right/left Static and dynamic   Tiltboard: forward/backward Static and dynamic   Sanddune Static and dynamic   Blue foams Staggered stance - dynamic   Walking with diagonal head turns 4 laps   Single leg stance Eyes open              Treatment/Session Summary:    · Response to Treatment:  Patient tolerated exercises without complaints of increased dizziness. Patient progressing well with therapy.     · Communication/Consultation:  None today  · Equipment provided today:  None today  · Recommendations/Intent for next treatment session: Next visit will focus on improving overall mobility and strength.     Total Treatment Billable Duration:  40 minutes  PT Patient Time In/Time Out  Time In: 1488  Time Out: [address] PT    Future Appointments   Date Time Provider Sydnee Schaefer   5/7/2021  9:30 AM Cesiliakatharina Sykes, PT [facility] SFE   5/13/2021  1:00 PM Cesilia Sykes, PT [facility] SFE   5/18/2021  9:30 AM Jayden Alamo Purpura, PT [facility] SFE   5/21/2021  9:30 AM Cesilia Ridlissette, PT [facility] SFE   5/25/2021  9:30 AM VisgeJayden Purpura, PT [facility] SFE   5/28/2021  9:30 AM VissaJayden hernandez Purpura, PT [facility]PT SFE

## 2021-05-07 ENCOUNTER — HOSPITAL ENCOUNTER (OUTPATIENT)
Dept: PHYSICAL THERAPY | Age: 76
Discharge: HOME | End: 2021-05-07
Payer: MEDICARE

## 2021-05-07 PROCEDURE — 97112 NEUROMUSCULAR REEDUCATION: CPT

## 2021-05-07 NOTE — PROGRESS NOTES
Brain Kirk  :   [place]  Secondary: [address] at [address] [facility]  [address], [address], [place] [address].  [phone]   [phone]        OUTPATIENT PHYSICAL THERAPY: Daily Treatment Note 2021  Visit Count:  10    ICD-10: Treatment Diagnosis:     Other abnormalities of gait and mobility (R26.89)   Difficulty in walking, not elsewhere classified (R26.2)   Precautions/Allergies:   Codeine   TREATMENT PLAN:  Effective Dates: 2021 TO 2021 (90 days). Frequency/Duration: 2 times a week for 90 Day(s)    Pre-treatment Symptoms/Complaints:  2021: Patient reports he is doing well today. Pain: Initial: Pain Intensity 1: 0  Post Session:  0/10   Medications Last Reviewed:  2021  Updated Objective Findings:  None Today  TREATMENT:     NEUROMUSCULAR RE-EDUCATION: (40 minutes):  Exercise/activities per grid below to improve balance and coordination. Required minimal visual and verbal cues to promote static and dynamic balance in standing. Date:  2021   Activity/Exercise Parameters   Walking with head turns 4 laps   Walking with head nods 4 laps   Marching in hallway 4 laps   Walking backward 4 laps   Circles around cones 3 reps  2 sets   Figure 8s around cones 3 reps  2 sets   Step taps 6 inch  15 reps forward  15 reps cross   Tiltboard: right/left Static and dynamic   Tiltboard: forward/backward Static and dynamic   Sanddune Static and dynamic   Blue foams Staggered stance - dynamic   Walking with diagonal head turns 4 laps   Single leg stance Eyes open   Sidestepping in hallway 4 laps   Tuning board Eyes open and eyes closed      Treatment/Session Summary:    · Response to Treatment:  Patient reports no headache or dizziness during or after treatment. Patient progressing well with therapy.      · Communication/Consultation:  None today  · Equipment provided today:  None today  · Recommendations/Intent for next treatment session: Next visit will focus on improving overall mobility and strength.     Total Treatment Billable Duration:  40 minutes  PT Patient Time In/Time Out  Time In: 0930  Time Out: [address], PT    Future Appointments   Date Time Provider Sydnee Schaefer   5/10/2021 10:15 AM Rick Workman, PT [facility] SFE   5/13/2021  1:00 PM Rick Workman, PT [facility] SFE   5/18/2021  9:30 AM Tatiana Alamo, PT [facility] SFE   5/21/2021  9:30 AM Tatiana Alamo, PT [facility] SFE   5/25/2021  9:30 AM Tatiana Alamo, PT [facility] SFE   5/28/2021  9:30 AM Tatiana Alamo, PT [facility] SFE

## 2021-05-10 ENCOUNTER — HOSPITAL ENCOUNTER (OUTPATIENT)
Dept: PHYSICAL THERAPY | Age: 76
Discharge: HOME | End: 2021-05-10
Payer: MEDICARE

## 2021-05-10 PROCEDURE — 97112 NEUROMUSCULAR REEDUCATION: CPT

## 2021-05-10 NOTE — PROGRESS NOTES
Seth Collins  :   [place]  Secondary: [address] at [address]  27[address] [facility], [address] [place] [address] 83,[address], 4[address] [facility]  [phone]   [phone]        OUTPATIENT PHYSICAL THERAPY: Daily Treatment Note 5/10/2021  Visit Count:  11    ICD-10: Treatment Diagnosis:     Other abnormalities of gait and mobility (R26.89)   Difficulty in walking, not elsewhere classified (R26.2)   Precautions/Allergies:   Codeine   TREATMENT PLAN:  Effective Dates: 2021 TO 2021 (90 days). Frequency/Duration: 2 times a week for 90 Day(s)    Pre-treatment Symptoms/Complaints:  5/10/2021: Patient reports he has a slight headache today. Pain: Initial: Pain Intensity 1: 2  Post Session:  0/10   Medications Last Reviewed:  5/10/2021  Updated Objective Findings:  None Today  TREATMENT:     NEUROMUSCULAR RE-EDUCATION: (40 minutes):  Exercise/activities per grid below to improve balance and coordination. Required minimal visual and verbal cues to promote static and dynamic balance in standing. Date:  5/10/2021   Activity/Exercise Parameters   Walking with head turns 4 laps   Walking with head nods 4 laps   Marching in hallway 4 laps   Walking backward 4 laps   Circles around cones 3 reps  2 sets   Figure 8s around cones 3 reps  2 sets   Step taps 6 inch  15 reps forward  15 reps cross   Tiltboard: right/left Static and dynamic   Tiltboard: forward/backward Static and dynamic   Sanddune Static and dynamic   Blue foams Staggered stance - dynamic   Walking with diagonal head turns 4 laps   Single leg stance Eyes open   Sidestepping in hallway 4 laps   Tuning board Eyes open and eyes closed      Treatment/Session Summary:    · Response to Treatment:  Patient reports slight dizziness with exercises, but dizziness decreased quickly with rest.  Patient reports no headache at the end of treatment.      · Communication/Consultation:  None today  · Equipment provided today:  None today  · Recommendations/Intent for next treatment session: Next visit will focus on improving overall mobility and strength.     Total Treatment Billable Duration:  40 minutes  PT Patient Time In/Time Out  Time In: 8800  Time Out: [address], PT    Future Appointments   Date Time Provider Sydnee Schaefer   5/13/2021  1:00 PM Mabel Kehr, PT [facility] SFE   5/18/2021  9:30 AM Lucy Alamo, PT [facility] SFE   5/21/2021  9:30 AM Lucy Alamo, PT [facility] SFE   5/25/2021  9:30 AM Lucy Alamo, PT [facility] SFE   5/28/2021  9:30 AM Lucy Alamo, PT [facility] SFE

## 2021-05-13 ENCOUNTER — HOSPITAL ENCOUNTER (OUTPATIENT)
Dept: PHYSICAL THERAPY | Age: 76
Discharge: HOME | End: 2021-05-13
Payer: MEDICARE

## 2021-05-13 PROCEDURE — 97112 NEUROMUSCULAR REEDUCATION: CPT

## 2021-05-13 NOTE — PROGRESS NOTES
Madonna Morris  :   [place]  [address]: [address] [place] [id number] [facility], [address] [place] [address],[address], 0[address] [facility]  [phone]   [phone]        OUTPATIENT PHYSICAL THERAPY: Daily Treatment Note 2021  Visit Count:  12    ICD-10: Treatment Diagnosis:     Other abnormalities of gait and mobility (R26.89)   Difficulty in walking, not elsewhere classified (R26.2)   Precautions/Allergies:   Codeine   TREATMENT PLAN:  Effective Dates: 2021 TO 2021 (90 days). Frequency/Duration: 2 times a week for 90 Day(s)    Pre-treatment Symptoms/Complaints:  2021: Patient reports he is doing okay. Pain: Initial: Pain Intensity 1: 0  Post Session:  0/10   Medications Last Reviewed:  2021  Updated Objective Findings:  None Today  TREATMENT:     NEUROMUSCULAR RE-EDUCATION: (40 minutes):  Exercise/activities per grid below to improve balance and coordination. Required minimal visual and verbal cues to promote static and dynamic balance in standing. Date:  2021   Activity/Exercise Parameters   Walking with head turns 4 laps   Walking with head nods 4 laps   Marching in hallway 4 laps   Walking backward 4 laps   Circles around cones 3 reps  2 sets   Figure 8s around cones 3 reps  2 sets   Step taps 6 inch  15 reps forward  15 reps cross   Tiltboard: right/left Static and dynamic   Tiltboard: forward/backward Static and dynamic   Sanddune Static and dynamic   Blue foams Staggered stance - dynamic   Walking with diagonal head turns 4 laps   Single leg stance Eyes open   Sidestepping in hallway 4 laps   Tuning board Eyes open and eyes closed      Treatment/Session Summary:    · Response to Treatment:  Patient had no complaints of dizziness or headache during or after treatment.      · Communication/Consultation:  None today  · Equipment provided today:  None today  · Recommendations/Intent for next treatment session: Next visit will focus on improving overall mobility and strength.     Total Treatment Billable Duration:  40 minutes  PT Patient Time In/Time Out  Time In: 1300  Time Out: Frida Mondragon, PT    Future Appointments   Date Time Provider Sydnee Schaefer   5/18/2021  9:30 AM Donny Carlson, [facility] ANATOLY   5/21/2021  9:30 AM Katie Alamo, PT [facility]   5/25/2021  9:30 AM Katie lAamo, PT [facility] SFE   5/28/2021  9:30 AM Katie Alamo, PT [facility]

## 2021-05-18 ENCOUNTER — HOSPITAL ENCOUNTER (OUTPATIENT)
Dept: PHYSICAL THERAPY | Age: 76
Discharge: HOME | End: 2021-05-18
Payer: MEDICARE

## 2021-05-18 PROCEDURE — 97112 NEUROMUSCULAR REEDUCATION: CPT

## 2021-05-18 NOTE — PROGRESS NOTES
Melinda Tovar  :   [place]  Secondary: [place] [id number] [facility], [address] [place] [address],[address], [facility] [place] [address]  [phone]   [phone]        OUTPATIENT PHYSICAL THERAPY: Daily Treatment Note 2021  Visit Count:  13    ICD-10: Treatment Diagnosis:     Other abnormalities of gait and mobility (R26.89)   Difficulty in walking, not elsewhere classified (R26.2)   Precautions/Allergies:   Codeine   TREATMENT PLAN:  Effective Dates: 2021 TO 2021 (90 days). Frequency/Duration: 2 times a week for 90 Day(s)    Pre-treatment Symptoms/Complaints:  2021: Patient reports he has a headache. \"I had a couple of dizzy spells over the weekend\". Pain: Initial: Pain Intensity 1: 4  Post Session:  4/10   Medications Last Reviewed:  2021  Updated Objective Findings:  None Today  TREATMENT:     NEUROMUSCULAR RE-EDUCATION: (45 minutes):  Exercise/activities per grid below to improve balance and coordination. Required minimal visual and verbal cues to promote static and dynamic balance in standing. Date:  2021   Activity/Exercise Parameters   Walking with head turns 4 laps   Walking with head nods 4 laps   Marching in hallway 4 laps   Walking backward 4 laps   Circles around cones 3 reps  2 sets   Figure 8s around cones 3 reps  2 sets   Step taps 6 inch  15 reps forward  15 reps cross   Tiltboard: right/left Static and dynamic   Tiltboard: forward/backward Static and dynamic   Sanddune Static and dynamic   Blue foams Staggered stance - dynamic   Walking with diagonal head turns 4 laps   Single leg stance Eyes open   Sidestepping in hallway 4 laps   Tandem walking 4 laps   Tuning board Eyes open and eyes closed      Treatment/Session Summary:    · Response to Treatment:  Patient reports no headache after treatment. Patient tolerated exercises well.       · Communication/Consultation:  None today  · Equipment provided today:  None today  · Recommendations/Intent for next treatment session: Next visit will focus on improving overall mobility and strength.     Total Treatment Billable Duration:  45 minutes  PT Patient Time In/Time Out  Time In: 0930  Time Out: Frida Mondragon, PT    Future Appointments   Date Time Provider Sydnee Schaefer   5/21/2021  9:30 AM David Strickland, EMILIANO [facility] ANATOLY   5/25/2021  9:30 AM Cornelius Alamo PT [facility]PT SFE   5/28/2021  9:30 AM Cornelius Alamo, EMILIANO [facility]

## 2021-05-21 ENCOUNTER — HOSPITAL ENCOUNTER (OUTPATIENT)
Dept: PHYSICAL THERAPY | Age: 76
Discharge: HOME | End: 2021-05-21
Payer: MEDICARE

## 2021-05-21 PROCEDURE — 97112 NEUROMUSCULAR REEDUCATION: CPT

## 2021-05-21 NOTE — PROGRESS NOTES
Seth Collins  : [id number]  [place]  Secondary: [address] [facility]  [address], [address], [place] [address].  [phone]   [phone]        OUTPATIENT PHYSICAL THERAPY: Daily Treatment Note 2021  Visit Count:  14    ICD-10: Treatment Diagnosis:     Other abnormalities of gait and mobility (R26.89)   Difficulty in walking, not elsewhere classified (R26.2)   Precautions/Allergies:   Codeine   TREATMENT PLAN:  Effective Dates: 2021 TO 2021 (90 days). Frequency/Duration: 2 times a week for 90 Day(s)    Pre-treatment Symptoms/Complaints:  2021: Patient reports he is feeling better today. Patient reports no headache and no dizziness. Pain: Initial: Pain Intensity 1: 0  Post Session:  0/10   Medications Last Reviewed:  2021  Updated Objective Findings:  None Today  TREATMENT:     NEUROMUSCULAR RE-EDUCATION: (40 minutes):  Exercise/activities per grid below to improve balance and coordination. Required minimal visual and verbal cues to promote static and dynamic balance in standing. Date:  2021   Activity/Exercise Parameters   Walking with head turns 4 laps   Walking with head nods 4 laps   Marching in hallway 4 laps   Walking backward 4 laps   Circles around cones 3 reps  2 sets   Figure 8s around cones 3 reps  2 sets   Step taps 6 inch  15 reps forward  15 reps cross   Tiltboard: right/left Static and dynamic   Tiltboard: forward/backward Static and dynamic   Sanddune Static and dynamic   Blue foams Staggered stance - dynamic   Walking with diagonal head turns 4 laps   Single leg stance Eyes open   Sidestepping in hallway 4 laps   Tandem walking 4 laps    Tuning board Eyes open and eyes closed      Treatment/Session Summary:    · Response to Treatment:  Patient demonstrates improved stability with exercises.   Patient reports no increase in headache or dizziness during treatment. · Communication/Consultation:  None today  · Equipment provided today:  None today  · Recommendations/Intent for next treatment session: Next visit will focus on improving overall mobility and strength.     Total Treatment Billable Duration:  40 minutes  PT Patient Time In/Time Out  Time In: 0930  Time Out: [address], PT    Future Appointments   Date Time Provider Sydnee Schaefer   5/25/2021  9:30 AM Columba Montilla, PT [facility] ANATOLY   5/28/2021  9:30 AM [facility]

## 2021-05-25 ENCOUNTER — HOSPITAL ENCOUNTER (OUTPATIENT)
Dept: PHYSICAL THERAPY | Age: 76
Discharge: HOME | End: 2021-05-25
Payer: MEDICARE

## 2021-05-25 NOTE — PROGRESS NOTES
Lillie Salmon  :   [place]  [place] [address] [facility] at [facility]  2700 [facility], [address] [place] [address],[address] [place] [address].  [phone]   [phone]     OUTPATIENT DAILY NOTE    NAME/AGE/GENDER: Lillie Salmon is a 76 y.o. male. DATE: 2021    Mr. Arlene Jordan arrived for his appointment reporting he felt like he was going to pass out. Patient also reports increased sweating and heart racing. Patient felt fine coming to therapy, but symptoms began in the waiting room. Blood pressure was taken and it was 107/84 with heart rate of 158. Patient declined going to emergency room. Patient advised to call his doctor. Patient escorted down to his car. Physical therapy appointment canceled today.     Fiordaliza Smith, PT

## 2021-05-28 ENCOUNTER — HOSPITAL ENCOUNTER (OUTPATIENT)
Dept: PHYSICAL THERAPY | Age: 76
Discharge: HOME | End: 2021-05-28
Payer: MEDICARE

## 2021-05-28 PROCEDURE — 97112 NEUROMUSCULAR REEDUCATION: CPT

## 2021-05-28 NOTE — PROGRESS NOTES
Avni Morales  : [id number]  [place]  Secondary: [place] [id number] [facility], [address] [place] [address],[address], [facility].  [phone]   [phone]        OUTPATIENT PHYSICAL THERAPY: Daily Treatment Note 2021  Visit Count:  15    ICD-10: Treatment Diagnosis:     Other abnormalities of gait and mobility (R26.89)   Difficulty in walking, not elsewhere classified (R26.2)   Precautions/Allergies:   Codeine   TREATMENT PLAN:  Effective Dates: 2021 TO 2021 (90 days). Frequency/Duration: 2 times a week for 90 Day(s). Patient discharged from physical therapy due to meeting all of his goals. Pre-treatment Symptoms/Complaints:  2021: Patient reports no dizziness. \"I feel comfortable being discharged\". Pain: Initial: Pain Intensity 1: 0  Post Session:  0/10   Medications Last Reviewed:  2021  Updated Objective Findings:  See discharge note  TREATMENT:     NEUROMUSCULAR RE-EDUCATION: (35 minutes):  Exercise/activities per grid below to improve balance and coordination. Required minimal visual and verbal cues to promote static and dynamic balance in standing.    Date:  2021   Activity/Exercise Parameters   Walking with head turns 4 laps   Walking with head nods 4 laps   Step ups 6 inch  2x10 reps   Walking backward 4 laps   Circles around cones 3 reps  2 sets   Figure 8s around cones 3 reps  2 sets   Step taps 6 inch  15 reps forward  15 reps cross   Tiltboard: right/left Static and dynamic   Tiltboard: forward/backward Static and dynamic   Sanddune Static and dynamic   Blue foams Staggered stance - dynamic   Walking with diagonal head turns 4 laps   Single leg stance X   Stepping over half foam 2x10 reps   Turning 360s 2 circles   Tuning board Eyes open and eyes closed      Treatment/Session Summary:    · Response to Treatment:  Patient tolerated treatment with no complaints of increased dizziness. · Communication/Consultation:  None today  · Equipment provided today:  None today  · Recommendations/Intent for next treatment session: Patient discharged from physical therapy due to meeting all of his goals. Total Treatment Billable Duration:  35 minutes  PT Patient Time In/Time Out  Time In: 0930  Time Out: [address], PT    No future appointments.

## 2021-05-28 NOTE — THERAPY DISCHARGE
Avni Morales  :   [place]  Secondary: [address] at [facility]  [address], [address] [facility],[address], [address]  [phone]   [phone]          OUTPATIENT PHYSICAL THERAPY:Discharge Summary 2021   ICD-10: Treatment Diagnosis:    Other abnormalities of gait and mobility (R26.89)   Difficulty in walking, not elsewhere classified (R26.2)   Precautions/Allergies:   Magnesium, Codeine, and Tramadol   TREATMENT PLAN:  Effective Dates: 2021 TO 2021 (90 days). Frequency/Duration: 2 times a week for 90 Day(s). Patient discharged due to meeting all of his goals. MEDICAL/REFERRING DIAGNOSIS:  Cerebral infarction, unspecified [I63.9]   DATE OF ONSET: 2021  REFERRING PHYSICIAN: Tiffany Leal MD MD Orders: Evaluate and Treat  Return MD Appointment: TBD     ASSESSMENT:  Mr. Natalie Resendiz presents with improving mobility, strength, endurance, and vestibular habituation with daily activities. Patient has attended a total of 15 scheduled physical therapy visits including initial evaluation on 2021. Treatment has consisted of balance and vestibular training to improve overall safety, mobility, and performance with activities of daily living. Patient is making excellent progress with all aspects of PT. Discharge note:  Patient attended fifteen scheduled physical therapy appointments from 2021 to . Patient demonstrates improved balance. Patient is having no complaints of dizziness with daily activities. Patient discharged from physical therapy due to meeting all of his goals. PROBLEM LIST (Impacting functional limitations):  1. Decreased Strength  2. Decreased ADL/Functional Activities  3. Decreased Ambulation Ability/Technique  4. Decreased Balance  5. Decreased Activity Tolerance INTERVENTIONS PLANNED: (Treatment may consist of any combination of the following)  1.  Balance Exercise  2. Gait Training  3. Home Exercise Program (HEP)  4. Neuromuscular Re-education/Strengthening  5. Therapeutic Activites  6. Therapeutic Exercise/Strengthening     GOALS: (Goals have been discussed and agreed upon with patient.)  Short-Term Functional Goals: Time Frame: 30 days  1. Patient will be independent with home exercise program without exacerbation of symptoms or cueing needed--goal met. Discharge Goals: Time Frame: 90 days  1. Patient will be independent with all ADLs with minimal fear of falling and loss of balance with daily tasks--goal met. 2. Patient will report no fear avoidance with social or recreational activities due to fear of falling--goal met. 3. Patient will score greater than or equal to 50/56 on Cummings Balance Scale with minimal effect of dizziness or imbalance on patient's ability to manage every day life activities--goal met. 4. Patient will score greater than or equal to 20/24 on Dynamic Gait Index with minimal effect of dizziness or imbalance on patient's ability to manage every day life activities--goal met. OUTCOME MEASURE:   Tool Used: Cummings Balance Scale  Score:  Initial: 42/56 Most Recent: 50/56 (Date: 4/27/2021 )   Interpretation of Score: Each section is scored on a 0-4 scale, 0 representing the patients inability to perform the task and 4 representing independence. The scores of each section are added together for a total score of 56. The higher the patients score, the more independent the patient is. Any score below 45 indicates increased risk for falls. Tool Used: Dynamic Gait Index  Score:  Initial: 18/24 Most Recent: 20/24 (Date: 4/27/2021 )   Interpretation of Score: Each section is scored on a 0-3 scale, 0 representing the patients inability to perform the task and 3 representing independence. The scores of each section are added together for a total score of 24. Any score below 19 indicates increased risk for falls.          PAIN/SUBJECTIVE: Initial: Pain Intensity 1: 0  Post Session:  0/10   HISTORY:   History of Injury/Illness (Reason for Referral):  Patient reports he has been feeling off balance and dizzy for several weeks now. He reports he was in the hospital with elevated blood pressure. He reports his blood pressure is under better control now, but he is still dizzy and tends to veer to the right when he walks. 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 He reports that he can hear a little out of his right ear with the hearing aide, but can not hear at all out of his left ear. He reports he would like to work on improving his balance and dizziness so he is not afraid of falling.    4/27/2021 (Progress Note): Patient reports his dizziness is much better, but he is still having headaches. Past Medical History/Comorbidities:   Mr. Karuna Boyer  has a past medical history of CAD (coronary artery disease), Cancer ([facility] [address] Hypertension, and Ill-defined condition. Mr. Karuna Boyer  has no past surgical history on file. Social History/Living Environment:   Home Environment: Private residence  Living Alone: No  Support Systems: Family member(s), Friends \ neighbors  Prior Level of Function/Work/Activity:  Independent  Dominant Side:         RIGHT  Personal Factors:          Sex:  male        Age:  76 y.o. Current Medications:       Current Outpatient Medications:     metoprolol succinate (TOPROL-XL) 50 mg XL tablet, TAKE 1 TABLET BY MOUTH EVERY DAY, Disp: , Rfl:     famotidine (PEPCID) 40 mg tablet, Take 40 mg by mouth daily. , Disp: , Rfl:     ipratropium (ATROVENT) 21 mcg (0.03 %) nasal spray, 2 SPRAYS INTO EACH NOSTRIL 2 (TWO) TIMES A DAY, Disp: , Rfl:     ascorbic acid, vitamin C, (Vitamin C) 1,000 mg tablet, Take 1,000 mg by mouth., Disp: , Rfl:     multivitamin capsule, Take 1 Cap by mouth daily. , Disp: , Rfl:     cholecalciferol, vitamin D3, (VITAMIN D3 PO), Take 2,000 mg by mouth., Disp: , Rfl:     amLODIPine (NORVASC) 10 mg tablet, Take 1 Tab by mouth daily. , Disp: 30 Tab, Rfl: 0    losartan (COZAAR) 25 mg tablet, Take 25 mg by mouth daily. , Disp: , Rfl:     magnesium 250 mg tab, Take 400 mg by mouth three (3) times daily. , Disp: , Rfl:     Fenofibrate 50 mg cap, Take 50 mg by mouth daily. , Disp: , Rfl:    Date Last Reviewed:  5/28/2021    EXAMINATION:   Observation/Orthostatic Postural Assessment:          Posture Assessment: Rounded shoulders, Forward head   Functional Mobility:   Gait/Mobility:    Base of Support: Center of gravity altered  Speed/Debby: Pace decreased (<100 feet/min)  Step Length: Left shortened, Right shortened  Swing Pattern: Left asymmetrical, Right asymmetrical  Stance: Left decreased, Right decreased  Gait Abnormalities: Antalgic  Ambulation - Level of Assistance: Independent  · Interventions: Verbal cues, Safety awareness training          Transfers:     Sit to Stand: Independent  Stand to Sit: Independent  Stand Pivot Transfers: Independent  Bed to Chair: Independent  Lateral Transfers: Independent         Bed Mobility:     Rolling: Independent  Supine to Sit: Independent  Sit to Supine: Independent  Scooting: Independent       Strength:          UE STRENGTH: 4/5 shoulder flexion, 4/5 shoulder abduction, 4/5 shoulder extension, 4/5 elbow flexion, 4/5 elbow extension. LE STRENGTH:  4/5 hip flexion, 4/5 hip abduction, 4/5 hip adduction, 4/5 hip extension, 4/5 knee extension, 4/5 knee flexion, 3/5 ankle dorsiflexion, 3/5 ankle plantarflexion, 3/5 ankle inversion, 3/5 ankle eversion.   Sensation:         Intact

## 2021-08-10 ENCOUNTER — APPOINTMENT (RX ONLY)
Dept: URBAN - NONMETROPOLITAN AREA CLINIC 1 | Facility: CLINIC | Age: 76
Setting detail: DERMATOLOGY
End: 2021-08-10

## 2021-08-10 DIAGNOSIS — Z41.9 ENCOUNTER FOR PROCEDURE FOR PURPOSES OTHER THAN REMEDYING HEALTH STATE, UNSPECIFIED: ICD-10-CM

## 2021-08-10 PROCEDURE — ? DYSPORT
